# Patient Record
Sex: FEMALE | Race: WHITE | NOT HISPANIC OR LATINO | Employment: FULL TIME | URBAN - METROPOLITAN AREA
[De-identification: names, ages, dates, MRNs, and addresses within clinical notes are randomized per-mention and may not be internally consistent; named-entity substitution may affect disease eponyms.]

---

## 2017-03-01 ENCOUNTER — HOSPITAL ENCOUNTER (OUTPATIENT)
Dept: URGENT CARE | Facility: CLINIC | Age: 42
Setting detail: SPECIMEN
Discharge: HOME | End: 2017-03-01
Attending: FAMILY MEDICINE | Admitting: FAMILY MEDICINE

## 2017-03-01 LAB
C TRACH RRNA SPEC QL PROBE: NORMAL
N GONORRHOEA RRNA SPEC QL PROBE: NORMAL
SPECIMEN SOURCE: NORMAL

## 2017-06-19 ENCOUNTER — OFFICE VISIT (OUTPATIENT)
Dept: FAMILY MEDICINE CLINIC | Facility: CLINIC | Age: 42
End: 2017-06-19

## 2017-06-19 VITALS
HEART RATE: 73 BPM | DIASTOLIC BLOOD PRESSURE: 88 MMHG | HEIGHT: 64 IN | WEIGHT: 181.3 LBS | TEMPERATURE: 98.7 F | SYSTOLIC BLOOD PRESSURE: 118 MMHG | OXYGEN SATURATION: 99 % | BODY MASS INDEX: 30.95 KG/M2

## 2017-06-19 DIAGNOSIS — Z00.00 HEALTH CARE MAINTENANCE: Primary | ICD-10-CM

## 2017-06-19 LAB
ALBUMIN SERPL-MCNC: 4.5 G/DL (ref 3.5–5.2)
ALBUMIN/GLOB SERPL: 1.2 G/DL
ALP SERPL-CCNC: 57 U/L (ref 39–117)
ALT SERPL-CCNC: 20 U/L (ref 1–33)
AST SERPL-CCNC: 22 U/L (ref 1–32)
BASOPHILS # BLD AUTO: 0.05 10*3/MM3 (ref 0–0.2)
BASOPHILS NFR BLD AUTO: 1.1 % (ref 0–1.5)
BILIRUB SERPL-MCNC: 0.4 MG/DL (ref 0.1–1.2)
BUN SERPL-MCNC: 12 MG/DL (ref 6–20)
BUN/CREAT SERPL: 12.8 (ref 7–25)
CALCIUM SERPL-MCNC: 9.5 MG/DL (ref 8.6–10.5)
CHLORIDE SERPL-SCNC: 101 MMOL/L (ref 98–107)
CHOLEST SERPL-MCNC: 163 MG/DL (ref 0–200)
CO2 SERPL-SCNC: 23 MMOL/L (ref 22–29)
CREAT SERPL-MCNC: 0.94 MG/DL (ref 0.57–1)
DIFFERENTIAL COMMENT: NORMAL
EOSINOPHIL # BLD AUTO: 0.06 10*3/MM3 (ref 0–0.7)
EOSINOPHIL NFR BLD AUTO: 1.3 % (ref 0.3–6.2)
ERYTHROCYTE [DISTWIDTH] IN BLOOD BY AUTOMATED COUNT: 14.4 % (ref 11.7–13)
GLOBULIN SER CALC-MCNC: 3.7 GM/DL
GLUCOSE SERPL-MCNC: 99 MG/DL (ref 65–99)
HBA1C MFR BLD: 5.84 % (ref 4.8–5.6)
HCT VFR BLD AUTO: 40.5 % (ref 35.6–45.5)
HDLC SERPL-MCNC: 87 MG/DL (ref 40–60)
HGB BLD-MCNC: 13.6 G/DL (ref 11.9–15.5)
IMM GRANULOCYTES # BLD: 0 10*3/MM3 (ref 0–0.03)
IMM GRANULOCYTES NFR BLD: 0 % (ref 0–0.5)
LDLC SERPL CALC-MCNC: 65 MG/DL (ref 0–100)
LYMPHOCYTES # BLD AUTO: 2.62 10*3/MM3 (ref 0.9–4.8)
LYMPHOCYTES NFR BLD AUTO: 56.5 % (ref 19.6–45.3)
MCH RBC QN AUTO: 30.6 PG (ref 26.9–32)
MCHC RBC AUTO-ENTMCNC: 33.6 G/DL (ref 32.4–36.3)
MCV RBC AUTO: 91 FL (ref 80.5–98.2)
MONOCYTES # BLD AUTO: 0.33 10*3/MM3 (ref 0.2–1.2)
MONOCYTES NFR BLD AUTO: 7.1 % (ref 5–12)
NEUTROPHILS # BLD AUTO: 1.58 10*3/MM3 (ref 1.9–8.1)
NEUTROPHILS NFR BLD AUTO: 34 % (ref 42.7–76)
PLATELET # BLD AUTO: 224 10*3/MM3 (ref 140–500)
PLATELET BLD QL SMEAR: NORMAL
POTASSIUM SERPL-SCNC: 4.2 MMOL/L (ref 3.5–5.2)
PROT SERPL-MCNC: 8.2 G/DL (ref 6–8.5)
RBC # BLD AUTO: 4.45 10*6/MM3 (ref 3.9–5.2)
RBC MORPH BLD: NORMAL
SODIUM SERPL-SCNC: 139 MMOL/L (ref 136–145)
TRIGL SERPL-MCNC: 57 MG/DL (ref 0–150)
VLDLC SERPL CALC-MCNC: 11.4 MG/DL (ref 5–40)
WBC # BLD AUTO: 4.64 10*3/MM3 (ref 4.5–10.7)

## 2017-06-19 PROCEDURE — 99396 PREV VISIT EST AGE 40-64: CPT | Performed by: FAMILY MEDICINE

## 2017-06-19 NOTE — PROGRESS NOTES
"Ofelia Holder is a 41 y.o. female.     Chief Complaint   Patient presents with   • Annual Exam     pt is fasting        History of Present Illness    Here for annual complete physical examination.  She's gained a little bit of weight.  She works at home.  Start exercising last couple weeks.  She is walking 2 miles a day about 4 times a week.    Social History   Substance Use Topics   • Smoking status: Never Smoker   • Smokeless tobacco: Never Used   • Alcohol use Yes      Comment: SOCIAL USE ...  6-8 drinks a week     Immunization History   Administered Date(s) Administered   • Influenza, Quadrivalent 10/01/2014   • Tdap 08/01/2006, 06/06/2016       No change in her family history.  Still diabetes and hypertension.  No cancer in immediate relatives.    Patient like to be screened for diabetes.    She continues to follow with her gynecologist.  She had a hysterectomy done, ovary still present, last year for fibroids.  Her ongoing iron deficiency anemia resolved.    The following portions of the patient's history were reviewed and updated as appropriate: allergies, current medications, past family history, past medical history, past social history, past surgical history and problem list.          Review of Systems   Constitutional: Negative.    HENT: Negative.    Respiratory: Negative.    Cardiovascular: Negative.  Negative for chest pain, palpitations and leg swelling.   Gastrointestinal: Positive for constipation. Negative for blood in stool.   Endocrine: Negative.    Genitourinary: Negative.    Musculoskeletal: Negative.    Skin: Negative.    Neurological: Negative.    Psychiatric/Behavioral: Negative.  Negative for dysphoric mood.   All other systems reviewed and are negative.      Objective   Blood pressure 118/88, pulse 73, temperature 98.7 °F (37.1 °C), temperature source Oral, height 64\" (162.6 cm), weight 181 lb 4.8 oz (82.2 kg), last menstrual period 03/03/2016, SpO2 99 %.  Physical Exam "   Constitutional: She is oriented to person, place, and time. She appears well-developed and well-nourished.   HENT:   Head: Normocephalic and atraumatic.   Right Ear: Tympanic membrane, external ear and ear canal normal.   Left Ear: Tympanic membrane, external ear and ear canal normal.   Nose: Nose normal.   Mouth/Throat: Oropharynx is clear and moist. No oropharyngeal exudate.   Eyes: EOM are normal. Pupils are equal, round, and reactive to light. No scleral icterus.   Neck: Normal range of motion. Neck supple. No thyromegaly present.   Cardiovascular: Normal rate, regular rhythm, normal heart sounds and intact distal pulses.    No murmur heard.  Pulmonary/Chest: Effort normal and breath sounds normal. She has no wheezes.   Abdominal: Soft. Bowel sounds are normal. She exhibits no distension and no mass. There is no tenderness. No hernia.   Musculoskeletal: Normal range of motion. She exhibits no edema.   Lymphadenopathy:     She has no cervical adenopathy.   Neurological: She is alert and oriented to person, place, and time. She exhibits normal muscle tone.   Skin: Skin is warm and dry. No rash noted.   Psychiatric: She has a normal mood and affect. Her behavior is normal. Judgment and thought content normal.   Nursing note and vitals reviewed.      Assessment/Plan   Noemi was seen today for annual exam.    Diagnoses and all orders for this visit:    Health care maintenance  -     Hemoglobin A1c  -     CBC & Differential  -     Comprehensive Metabolic Panel  -     Lipid Panel    Annual health care maintenance examination.  Diet and exercise discussed in great detail.  Moderate alcohol use discussed.  Checking lab work including screening diabetes lab work below.  I will see her back in one year for recheck.  Lab work prior.  She will continue to follow with her gynecologist.  She has mammograms through them.  Immunizations are up-to-date.

## 2017-09-11 ENCOUNTER — TELEPHONE (OUTPATIENT)
Dept: FAMILY MEDICINE CLINIC | Facility: CLINIC | Age: 42
End: 2017-09-11

## 2017-09-11 DIAGNOSIS — K59.00 CONSTIPATION, UNSPECIFIED CONSTIPATION TYPE: Primary | ICD-10-CM

## 2017-09-11 NOTE — TELEPHONE ENCOUNTER
She would like to see a gastroenterologist. And then just try the Miralax and Senokox until seen by them in the mean time. Please let me know the dr so I can put in the order. Thanks

## 2017-09-11 NOTE — TELEPHONE ENCOUNTER
Pt is calling saying that she still has a lot of problems with constipation she drinks a lot of water daily. And has increased her fruits and veg daily and takes mirlax daily and its not help. She was wondering how you felt about her taking Linzess 72 mcg daily? Please advise.

## 2017-09-11 NOTE — TELEPHONE ENCOUNTER
That is not a medication I typically prescribed.  Usually only prescribed by gastroenterologist.  Also very expensive.  I would be happy to see her in the office to discuss options, also to see if there is further workup needed.  In the meantime she could add to the MiraLAX some Senokot.  I would also be happy to refer her to a gastroenterologist, would NOT need to see me first

## 2017-11-01 ENCOUNTER — OFFICE VISIT (OUTPATIENT)
Dept: GASTROENTEROLOGY | Facility: CLINIC | Age: 42
End: 2017-11-01

## 2017-11-01 VITALS
HEIGHT: 64 IN | TEMPERATURE: 98.3 F | WEIGHT: 184 LBS | BODY MASS INDEX: 31.41 KG/M2 | DIASTOLIC BLOOD PRESSURE: 80 MMHG | SYSTOLIC BLOOD PRESSURE: 124 MMHG

## 2017-11-01 DIAGNOSIS — K59.04 CHRONIC IDIOPATHIC CONSTIPATION: Primary | ICD-10-CM

## 2017-11-01 PROCEDURE — 99203 OFFICE O/P NEW LOW 30 MIN: CPT | Performed by: INTERNAL MEDICINE

## 2017-11-01 RX ORDER — POLYETHYLENE GLYCOL 3350 17 G/17G
17 POWDER, FOR SOLUTION ORAL DAILY
COMMUNITY
End: 2018-10-25

## 2017-11-01 NOTE — PROGRESS NOTES
Chief Complaint   Patient presents with   • Constipation     Noemi Holder is a 41 y.o. female who presents with constipation.  Longstanding issue. She does not take medication she will not have a bowel movement for a week.  She has tried senna in the past.  She has tried MiraLAX and she is currently taking this once a day.  She is really not getting any relief from this.  She's used enemas in the past.  She denies abdominal pain but she does feel nauseated and she gets distended and bloated.  No FH CRC/polyps.  She had a normal egd/colonoscopy at age 38 for llq pain - these were normal.   She was foujd to have gallstones - these are just being observed.    HPI  Past Medical History:   Diagnosis Date   • Anemia    • Breakthrough bleeding on birth control pills    • Constipation    • Fibroids, subserous    • Lactose intolerance At age 3o   • PONV (postoperative nausea and vomiting)      Past Surgical History:   Procedure Laterality Date   • CERVICAL CERCLAGE     • COLONOSCOPY  At age 38   • HAMMER TOE REPAIR     • MYOMECTOMY     • PARTIAL HYSTERECTOMY     • WA LAP, RADICAL HYST W/ TUBE&OV, NODE BX N/A 3/8/2016    Procedure: TOTAL LAPAROSCOPIC HYSTERECTOMY W/KOURTNEY SALPINGECTOMY, DILATATION AND CURETTAGE, CYSTOSCOPY;  Surgeon: Sophie Fleming MD;  Location: [facility];  Service: Gynecology   • UPPER GASTROINTESTINAL ENDOSCOPY  At age 38       Current Outpatient Prescriptions:   •  BLACK CURRANT SEED OIL PO, Take  by mouth., Disp: , Rfl:   •  linaclotide (LINZESS) 145 MCG capsule capsule, Take 1 capsule by mouth Every Morning Before Breakfast., Disp: 30 capsule, Rfl: 5  •  magnesium hydroxide (DUNCAN CHEWS) 311 MG chewable tablet chewable tablet, Chew 311 mg Every 4 (Four) Hours As Needed., Disp: , Rfl:   •  Multiple Vitamins-Minerals (HAIR SKIN NAILS PO), Take  by mouth., Disp: , Rfl:   •  polyethylene glycol (MIRALAX) powder, Take 17 g by mouth Daily., Disp: , Rfl:   Allergies   Allergen Reactions   • Flagyl  [Metronidazole] Hives and Shortness Of Breath   • Penicillins Hives and Shortness Of Breath     Social History     Social History   • Marital status:      Spouse name: N/A   • Number of children: N/A   • Years of education: N/A     Occupational History   • Not on file.     Social History Main Topics   • Smoking status: Never Smoker   • Smokeless tobacco: Never Used   • Alcohol use Yes     3 Glasses of wine per week      Comment: SOCIAL USE ...  6-8 drinks a week   • Drug use: No   • Sexual activity: Yes     Partners: Male     Birth control/ protection: Condom     Other Topics Concern   • Not on file     Social History Narrative     Family History   Problem Relation Age of Onset   • Hypertension Mother    • Hypertension Father    • Diabetes Father    • Diabetes Sister      Review of Systems   Constitutional: Negative for appetite change and unexpected weight change.   Gastrointestinal: Positive for abdominal distention and constipation. Negative for abdominal pain and blood in stool.   All other systems reviewed and are negative.    Vitals:    11/01/17 1421   BP: 124/80   Temp: 98.3 °F (36.8 °C)     Last Weight    11/01/17  1421   Weight: 184 lb (83.5 kg)     Physical Exam   Constitutional: She is oriented to person, place, and time. She appears well-developed and well-nourished.   HENT:   Head: Normocephalic and atraumatic.   Eyes: Conjunctivae are normal. No scleral icterus.   Neck: Normal range of motion. Neck supple.   Pulmonary/Chest: Effort normal.   Abdominal: Soft. She exhibits distension.   Musculoskeletal: She exhibits no edema.   Neurological: She is alert and oriented to person, place, and time.   Skin: Skin is warm and dry.   Psychiatric: She has a normal mood and affect.   Nursing note and vitals reviewed.    No images are attached to the encounter.  No notes on file  Noemi was seen today for constipation.    Diagnoses and all orders for this visit:    Chronic idiopathic  constipation    Other orders  -     linaclotide (LINZESS) 145 MCG capsule capsule; Take 1 capsule by mouth Every Morning Before Breakfast.    Plan-  Trial Linzess 145 µg daily.  Continue with adequate fiber consumption as well as water consumption.  Moderate exercise is recommended.  I've asked her to call if she feels like she needs the higher dose of this medication.

## 2017-12-14 ENCOUNTER — TELEPHONE (OUTPATIENT)
Dept: GASTROENTEROLOGY | Facility: CLINIC | Age: 42
End: 2017-12-14

## 2017-12-14 NOTE — TELEPHONE ENCOUNTER
----- Message from Noemi Holder sent at 12/14/2017 12:12 PM EST -----  Regarding: Visit Follow-Up Question  Contact: [phone]  Good afternoon,  My name is Noemi Holder and I was in to see Dr. Poole last month and was prescribed Linzess. The medication has helped, but my prescription cost me $292 with insurance. That is not going to be sustainable and I was wondering if there was something else that could be prescribed that is more cost effective.    Thank you,    Noemi

## 2017-12-19 NOTE — TELEPHONE ENCOUNTER
Lorraine check to see if she can use a coupon card to get this for cheaper?  Otherwise please send Amitiza 8 µg twice a day to her pharmacy.  Please advise her to take this with food.  Please also offer her coupon card for this medication and make sure she has scheduled follow-up

## 2017-12-20 NOTE — TELEPHONE ENCOUNTER
Called pt and pt reports that she did try to use the coupon card at her [facility], but felt like they did not know what they were doing.   Advised pt we can resend the linzess script to a different pharmacy for her to try the discount card or we can send in new script for amitiza 8mcg bid to take with food.  Pt verb understanding and states for now she would like us to send her script to [facility].  Advised we will escribe this and for her .  Pt states she will call us back if the discount coupon does not work.

## 2017-12-21 ENCOUNTER — TELEPHONE (OUTPATIENT)
Dept: GASTROENTEROLOGY | Facility: CLINIC | Age: 42
End: 2017-12-21

## 2017-12-21 RX ORDER — LUBIPROSTONE 8 UG/1
8 CAPSULE ORAL 2 TIMES DAILY WITH MEALS
Qty: 60 CAPSULE | Refills: 3 | Status: SHIPPED | OUTPATIENT
Start: 2017-12-21 | End: 2018-02-21

## 2017-12-21 NOTE — TELEPHONE ENCOUNTER
See note of 12/14.    Call to pt.  Requesting Amitiza.  Pharmacy verified.  Escribe completed for Amitiza 8 mcg 1 tab po BID with food, #60, R3 escribe completed.

## 2017-12-29 ENCOUNTER — APPOINTMENT (OUTPATIENT)
Dept: WOMENS IMAGING | Facility: HOSPITAL | Age: 42
End: 2017-12-29

## 2017-12-29 PROCEDURE — 77063 BREAST TOMOSYNTHESIS BI: CPT | Performed by: RADIOLOGY

## 2017-12-29 PROCEDURE — 77067 SCR MAMMO BI INCL CAD: CPT | Performed by: RADIOLOGY

## 2018-02-21 ENCOUNTER — TELEPHONE (OUTPATIENT)
Dept: GASTROENTEROLOGY | Facility: CLINIC | Age: 43
End: 2018-02-21

## 2018-02-21 NOTE — TELEPHONE ENCOUNTER
Called pt and pt reports that she did not get the amitiza script filled due to the cost.  She has however been paying $300 per month for her [id number].  Pt states if she has her script sent to her [facility] and they run it as cash she can use the savings card.  Advised pt we would escribe linzess 1456mcg take one po daily before breakfast #30 with 3 refills to her [facility]. Pt verb understanding.     Update sent to Dr Jennings.

## 2018-02-21 NOTE — TELEPHONE ENCOUNTER
----- Message from Sue Shi sent at 2/21/2018  8:26 AM EST -----  Regarding: selin  Contact: [phone]  Pt called with questions about the saving card or changes med

## 2018-10-18 DIAGNOSIS — Z00.00 HEALTH CARE MAINTENANCE: Primary | ICD-10-CM

## 2018-10-18 DIAGNOSIS — R73.09 ELEVATED GLUCOSE LEVEL: ICD-10-CM

## 2018-10-19 LAB
ALBUMIN SERPL-MCNC: 5.1 G/DL (ref 3.5–5.2)
ALBUMIN/GLOB SERPL: 1.5 G/DL
ALP SERPL-CCNC: 63 U/L (ref 39–117)
ALT SERPL-CCNC: 19 U/L (ref 1–33)
APPEARANCE UR: CLEAR
AST SERPL-CCNC: 20 U/L (ref 1–32)
BASOPHILS # BLD MANUAL: 0.04 10*3/MM3 (ref 0–0.2)
BASOPHILS NFR BLD MANUAL: 1 % (ref 0–1.5)
BILIRUB SERPL-MCNC: 0.3 MG/DL (ref 0.1–1.2)
BILIRUB UR QL STRIP: NEGATIVE
BUN SERPL-MCNC: 11 MG/DL (ref 6–20)
BUN/CREAT SERPL: 11.2 (ref 7–25)
CALCIUM SERPL-MCNC: 9.9 MG/DL (ref 8.6–10.5)
CHLORIDE SERPL-SCNC: 101 MMOL/L (ref 98–107)
CHOLEST SERPL-MCNC: 149 MG/DL (ref 0–200)
CO2 SERPL-SCNC: 27.2 MMOL/L (ref 22–29)
COLOR UR: YELLOW
CREAT SERPL-MCNC: 0.98 MG/DL (ref 0.57–1)
DIFFERENTIAL COMMENT: ABNORMAL
EOSINOPHIL # BLD MANUAL: 0.04 10*3/MM3 (ref 0–0.7)
EOSINOPHIL NFR BLD MANUAL: 1 % (ref 0.3–6.2)
ERYTHROCYTE [DISTWIDTH] IN BLOOD BY AUTOMATED COUNT: 14.2 % (ref 11.7–13)
GLOBULIN SER CALC-MCNC: 3.3 GM/DL
GLUCOSE SERPL-MCNC: 104 MG/DL (ref 65–99)
GLUCOSE UR QL: NEGATIVE
HBA1C MFR BLD: 6 % (ref 4.8–5.6)
HCT VFR BLD AUTO: 43.6 % (ref 35.6–45.5)
HDLC SERPL-MCNC: 76 MG/DL (ref 40–60)
HGB BLD-MCNC: 14.2 G/DL (ref 11.9–15.5)
HGB UR QL STRIP: NEGATIVE
KETONES UR QL STRIP: NEGATIVE
LDLC SERPL CALC-MCNC: 63 MG/DL (ref 0–100)
LEUKOCYTE ESTERASE UR QL STRIP: NEGATIVE
LYMPHOCYTES # BLD MANUAL: 2.34 10*3/MM3 (ref 0.9–4.8)
LYMPHOCYTES NFR BLD MANUAL: 54 % (ref 19.6–45.3)
MCH RBC QN AUTO: 29.6 PG (ref 26.9–32)
MCHC RBC AUTO-ENTMCNC: 32.6 G/DL (ref 32.4–36.3)
MCV RBC AUTO: 91 FL (ref 80.5–98.2)
MONOCYTES # BLD MANUAL: 0.35 10*3/MM3 (ref 0.2–1.2)
MONOCYTES NFR BLD MANUAL: 8 % (ref 5–12)
NEUTROPHILS # BLD MANUAL: 1.47 10*3/MM3 (ref 1.9–8.1)
NEUTROPHILS NFR BLD MANUAL: 34 % (ref 42.7–76)
NITRITE UR QL STRIP: NEGATIVE
PH UR STRIP: 7 [PH] (ref 5–8)
PLATELET # BLD AUTO: 216 10*3/MM3 (ref 140–500)
PLATELET BLD QL SMEAR: ABNORMAL
POTASSIUM SERPL-SCNC: 4.4 MMOL/L (ref 3.5–5.2)
PROT SERPL-MCNC: 8.4 G/DL (ref 6–8.5)
PROT UR QL STRIP: NEGATIVE
RBC # BLD AUTO: 4.79 10*6/MM3 (ref 3.9–5.2)
RBC MORPH BLD: ABNORMAL
SODIUM SERPL-SCNC: 138 MMOL/L (ref 136–145)
SP GR UR: 1.01 (ref 1–1.03)
TRIGL SERPL-MCNC: 49 MG/DL (ref 0–150)
UROBILINOGEN UR STRIP-MCNC: NORMAL MG/DL
VLDLC SERPL CALC-MCNC: 9.8 MG/DL (ref 5–40)
WBC # BLD AUTO: 4.33 10*3/MM3 (ref 4.5–10.7)

## 2018-10-25 ENCOUNTER — OFFICE VISIT (OUTPATIENT)
Dept: FAMILY MEDICINE CLINIC | Facility: CLINIC | Age: 43
End: 2018-10-25

## 2018-10-25 VITALS
BODY MASS INDEX: 30.56 KG/M2 | WEIGHT: 179 LBS | HEIGHT: 64 IN | DIASTOLIC BLOOD PRESSURE: 96 MMHG | OXYGEN SATURATION: 100 % | TEMPERATURE: 98.1 F | HEART RATE: 87 BPM | SYSTOLIC BLOOD PRESSURE: 135 MMHG

## 2018-10-25 DIAGNOSIS — R73.01 IMPAIRED FASTING GLUCOSE: ICD-10-CM

## 2018-10-25 DIAGNOSIS — Z00.00 HEALTH CARE MAINTENANCE: Primary | ICD-10-CM

## 2018-10-25 DIAGNOSIS — Z23 NEED FOR IMMUNIZATION AGAINST INFLUENZA: ICD-10-CM

## 2018-10-25 DIAGNOSIS — R03.0 ELEVATED BLOOD PRESSURE READING: ICD-10-CM

## 2018-10-25 PROBLEM — K58.1 IRRITABLE BOWEL SYNDROME WITH CONSTIPATION: Status: ACTIVE | Noted: 2018-10-25

## 2018-10-25 PROCEDURE — 99396 PREV VISIT EST AGE 40-64: CPT | Performed by: FAMILY MEDICINE

## 2018-10-25 RX ORDER — BIOTIN 5 MG
1 TABLET ORAL DAILY
COMMUNITY
End: 2020-10-27

## 2018-10-25 RX ORDER — ACETAMINOPHEN 500 MG
500 TABLET ORAL EVERY 8 HOURS PRN
COMMUNITY
Start: 2018-03-15

## 2018-10-25 RX ORDER — METFORMIN HYDROCHLORIDE 500 MG/1
500 TABLET, EXTENDED RELEASE ORAL
Qty: 90 TABLET | Refills: 1 | Status: SHIPPED | OUTPATIENT
Start: 2018-10-25 | End: 2019-03-28 | Stop reason: SDUPTHER

## 2018-10-25 RX ORDER — NAPROXEN 500 MG/1
500 TABLET ORAL DAILY PRN
COMMUNITY
Start: 2018-03-15 | End: 2019-02-19

## 2018-10-25 RX ORDER — VALACYCLOVIR HYDROCHLORIDE 500 MG/1
500 TABLET, FILM COATED ORAL DAILY
COMMUNITY

## 2018-10-25 NOTE — PROGRESS NOTES
Subjective   Noemi Holder is a 42 y.o. female.     Chief Complaint   Patient presents with   • Annual Exam     follow up labs   • Insomnia     x 3 yrs   • Sore Throat     x 2 days        History of Present Illness    For annual wellness visit.    Slight URI symptoms with a sore throat for 2 days.  No fever.    Impaired fasting glucose.  Strong family history of diabetes in multiple relatives.  Her A1c is 6.0%.  It is up.  Her fasting glucose is minimally elevated.  No diabetic symptoms.  She has not been exercising as much as she used to.  He does have a plan to eat better and exercise more.    Insomnia.  Trouble falling asleep and trouble staying asleep.  Some anxiety and worrying.  No daily anxiety or panic attacks.  No depression symptoms.  Minimal alcohol use.  No exercising in the late evening.    Social History   Substance Use Topics   • Smoking status: Never Smoker   • Smokeless tobacco: Never Used   • Alcohol use Yes     3 Glasses of wine per week      Comment: SOCIAL USE ...  6-8 drinks a week     Immunization History   Administered Date(s) Administered   • Flu Mist 10/01/2014   • Tdap 08/01/2006, 06/06/2016     Family History   Problem Relation Age of Onset   • Hypertension Mother    • Hypertension Father    • Diabetes Father    • Diabetes Sister          The following portions of the patient's history were reviewed and updated as appropriate: allergies, current medications, past family history, past medical history, past social history, past surgical history and problem list.          Review of Systems   Constitutional: Negative.    HENT: Positive for sore throat.    Eyes: Negative.    Respiratory: Negative.    Cardiovascular: Negative.    Gastrointestinal: Negative.  Negative for blood in stool.   Endocrine: Negative.    Genitourinary: Negative.  Negative for difficulty urinating and hematuria.   Musculoskeletal: Negative.    Skin: Negative.    Neurological: Negative for headaches.  "  Psychiatric/Behavioral: Negative.  Negative for dysphoric mood.   All other systems reviewed and are negative.      Objective   Blood pressure 135/96, pulse 87, temperature 98.1 °F (36.7 °C), temperature source Oral, height 162.6 cm (64.02\"), weight 81.2 kg (179 lb), last menstrual period 03/03/2016, SpO2 100 %.  Physical Exam   Constitutional: She is oriented to person, place, and time. She appears well-developed and well-nourished.   HENT:   Head: Normocephalic and atraumatic.   Right Ear: Tympanic membrane, external ear and ear canal normal.   Left Ear: Tympanic membrane, external ear and ear canal normal.   Nose: Nose normal.   Mouth/Throat: Oropharynx is clear and moist. No oropharyngeal exudate.   Eyes: Pupils are equal, round, and reactive to light. EOM are normal. No scleral icterus.   Neck: Normal range of motion. Neck supple. No thyromegaly present.   Cardiovascular: Normal rate, regular rhythm, normal heart sounds and intact distal pulses.    No murmur heard.  Pulmonary/Chest: Effort normal and breath sounds normal. She has no wheezes.   Abdominal: Soft. Bowel sounds are normal. She exhibits no distension and no mass. There is no tenderness. No hernia.   Musculoskeletal: Normal range of motion. She exhibits no edema.   Lymphadenopathy:     She has no cervical adenopathy.   Neurological: She is alert and oriented to person, place, and time. She exhibits normal muscle tone.   Skin: Skin is warm and dry. No rash noted.   Psychiatric: She has a normal mood and affect. Her behavior is normal. Judgment and thought content normal.   Nursing note and vitals reviewed.      Assessment/Plan   Noemi was seen today for annual exam, insomnia and sore throat.    Diagnoses and all orders for this visit:    Health care maintenance    Impaired fasting glucose  -     Comprehensive Metabolic Panel; Future  -     Hemoglobin A1c; Future    Elevated blood pressure reading    Other orders  -     metFORMIN ER " (GLUCOPHAGE-XR) 500 MG 24 hr tablet; Take 1 tablet by mouth Daily With Breakfast.        Annual wellness visit.  Next    Immunizations.  Flu vaccine up-to-date.  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  She thinks she can get at work for free.    Insomnia.  Discussed sleep hygiene.  Discussed her anxiety symptoms.  Recommend she consider cognitive behavioral therapy through her work program.  We discussed mindfulness meditation in detail again for Internet resources.  This time no need for medication.  No need for SSRI education for anxiety.  However if anxiety getting worse to consider further treatment.    Recommend regular exercise may also help her insomnia.    Impaired fasting glucose.  A1c 6.0%.  Strong family history of diabetes.  I'm recommending metformin next our 500 mg daily to reduce glucose and possibly helping prevent evolution 2 diabetes type 2.  We discussed diet and exercise as above.  Side effects of medication discussed.    IBS constipation predominant.  She continues to follow with her GI doctor.  The metformin max a help this.    Elevated blood pressure.  She is going to be checking readings at home and sending us notes.  Otherwise see me in 6 months

## 2018-12-26 ENCOUNTER — APPOINTMENT (OUTPATIENT)
Dept: WOMENS IMAGING | Facility: HOSPITAL | Age: 43
End: 2018-12-26

## 2018-12-26 PROCEDURE — 77067 SCR MAMMO BI INCL CAD: CPT | Performed by: RADIOLOGY

## 2018-12-31 RX ORDER — LINACLOTIDE 145 UG/1
CAPSULE, GELATIN COATED ORAL
Qty: 30 CAPSULE | Refills: 0 | OUTPATIENT
Start: 2018-12-31

## 2019-01-02 ENCOUNTER — TELEPHONE (OUTPATIENT)
Dept: GASTROENTEROLOGY | Facility: CLINIC | Age: 44
End: 2019-01-02

## 2019-01-02 NOTE — TELEPHONE ENCOUNTER
----- Message from Brisa Carrillo sent at 1/2/2019  3:40 PM EST -----  Patient is needing a two week supply for linzess, she is out. She is scheduled to see misite late January for the med refill.     Thanks

## 2019-01-22 ENCOUNTER — OFFICE VISIT (OUTPATIENT)
Dept: GASTROENTEROLOGY | Facility: CLINIC | Age: 44
End: 2019-01-22

## 2019-01-22 VITALS
SYSTOLIC BLOOD PRESSURE: 132 MMHG | TEMPERATURE: 98 F | WEIGHT: 186.6 LBS | HEIGHT: 64 IN | BODY MASS INDEX: 31.86 KG/M2 | DIASTOLIC BLOOD PRESSURE: 76 MMHG

## 2019-01-22 DIAGNOSIS — K58.1 IRRITABLE BOWEL SYNDROME WITH CONSTIPATION: Primary | ICD-10-CM

## 2019-01-22 PROCEDURE — 99213 OFFICE O/P EST LOW 20 MIN: CPT | Performed by: NURSE PRACTITIONER

## 2019-01-22 RX ORDER — MELOXICAM 15 MG/1
15 TABLET ORAL AS NEEDED
Refills: 0 | COMMUNITY
Start: 2018-12-10 | End: 2019-02-19

## 2019-01-22 NOTE — PROGRESS NOTES
Chief Complaint   Patient presents with   • Constipation       Noemi Holder is a  43 y.o. female here for a follow up visit for constipation.    HPI  42 yo f presents today for follow up visit for IBS-C. She is a patient of Dr. Jennings. She was last seen in the office on 11/2017. She has hx IBS-C and admits the Linzess 145 just doesn't seem to be working well anymore. She would like to try the stronger dose. She denies any dysphagia, reflux, abd pain, N&V, diarrhea, rectal bleeding or melena. She admits her appetite is good and her weight is stable.     Her next colonoscopy will be due 2024.     Past Medical History:   Diagnosis Date   • Anemia    • Breakthrough bleeding on birth control pills    • Constipation    • Fibroids, subserous    • Lactose intolerance At age 3o   • PONV (postoperative nausea and vomiting)        Past Surgical History:   Procedure Laterality Date   • CERVICAL CERCLAGE     • COLONOSCOPY  At age 38   • HAMMER TOE REPAIR     • MYOMECTOMY     • PARTIAL HYSTERECTOMY     • IN LAP, RADICAL HYST W/ TUBE&OV, NODE BX N/A 3/8/2016    Procedure: TOTAL LAPAROSCOPIC HYSTERECTOMY W/KOURTNEY SALPINGECTOMY, DILATATION AND CURETTAGE, CYSTOSCOPY;  Surgeon: Sophie Fleming MD;  Location: [facility];  Service: Gynecology   • UPPER GASTROINTESTINAL ENDOSCOPY  At age 38       Scheduled Meds:    Continuous Infusions:  No current facility-administered medications for this visit.     PRN Meds:.    Allergies   Allergen Reactions   • Flagyl [Metronidazole] Hives and Shortness Of Breath   • Penicillins Hives and Shortness Of Breath       Social History     Socioeconomic History   • Marital status:      Spouse name: Not on file   • Number of children: Not on file   • Years of education: Not on file   • Highest education level: Not on file   Social Needs   • Financial resource strain: Not on file   • Food insecurity - worry: Not on file   • Food insecurity - inability: Not on file   • Transportation needs -  medical: Not on file   • Transportation needs - non-medical: Not on file   Occupational History   • Not on file   Tobacco Use   • Smoking status: Never Smoker   • Smokeless tobacco: Never Used   Substance and Sexual Activity   • Alcohol use: Yes     Types: 3 Glasses of wine per week     Comment: SOCIAL USE ...  6-8 drinks a week   • Drug use: No   • Sexual activity: Yes     Partners: Male     Birth control/protection: Condom   Other Topics Concern   • Not on file   Social History Narrative   • Not on file       Family History   Problem Relation Age of Onset   • Hypertension Mother    • Hypertension Father    • Diabetes Father    • Diabetes Sister        Review of Systems   Constitutional: Negative for appetite change, chills, diaphoresis, fatigue, fever and unexpected weight change.   HENT: Negative for nosebleeds, postnasal drip, sore throat, trouble swallowing and voice change.    Respiratory: Negative for cough, choking, chest tightness, shortness of breath and wheezing.    Cardiovascular: Negative for chest pain.   Gastrointestinal: Positive for constipation. Negative for abdominal distention, abdominal pain, anal bleeding, blood in stool, diarrhea, nausea, rectal pain and vomiting.   Endocrine: Negative for polydipsia, polyphagia and polyuria.   Musculoskeletal: Negative for gait problem.   Skin: Negative for rash and wound.   Allergic/Immunologic: Negative for food allergies.   Neurological: Negative for dizziness, speech difficulty and light-headedness.   Psychiatric/Behavioral: Negative for confusion, self-injury, sleep disturbance and suicidal ideas.       Vitals:    01/22/19 1505   BP: 132/76   Temp: 98 °F (36.7 °C)       Physical Exam   Constitutional: She is oriented to person, place, and time. She appears well-developed and well-nourished. She does not appear ill. No distress.   HENT:   Head: Normocephalic.   Eyes: Pupils are equal, round, and reactive to light.   Cardiovascular: Normal rate, regular  rhythm and normal heart sounds.   Pulmonary/Chest: Effort normal and breath sounds normal.   Abdominal: Soft. Bowel sounds are normal. She exhibits no distension and no mass. There is no hepatosplenomegaly. There is no tenderness. There is no rebound and no guarding. No hernia.   Musculoskeletal: Normal range of motion.   Neurological: She is alert and oriented to person, place, and time.   Skin: Skin is warm and dry.   Psychiatric: She has a normal mood and affect. Her speech is normal and behavior is normal. Judgment normal.       No images are attached to the encounter.    Assessment & Plan     1. Irritable bowel syndrome with constipation    IBS-C is not well controlled. Will give her samples of the 290 to try. Patient to call the office next week with update. Follow up with me or DR. Jennings in 1 year.

## 2019-01-23 ENCOUNTER — RESULTS ENCOUNTER (OUTPATIENT)
Dept: FAMILY MEDICINE CLINIC | Facility: CLINIC | Age: 44
End: 2019-01-23

## 2019-01-23 DIAGNOSIS — R73.01 IMPAIRED FASTING GLUCOSE: ICD-10-CM

## 2019-02-06 ENCOUNTER — TELEPHONE (OUTPATIENT)
Dept: GASTROENTEROLOGY | Facility: CLINIC | Age: 44
End: 2019-02-06

## 2019-02-06 NOTE — TELEPHONE ENCOUNTER
----- Message from Sue Shi sent at 2/6/2019  3:13 PM EST -----  Regarding: linzess 290 mg   Contact: [phone]  Pt stated medication is not work and taking two dose

## 2019-02-07 NOTE — TELEPHONE ENCOUNTER
Would continue the Linzess 290 daily and add some daily miralax and a stool softener if she hasnt already.

## 2019-02-07 NOTE — TELEPHONE ENCOUNTER
Call to pt.  States has been taking linzess 290 mcg as instructed with o/v of 1/22.  Has BM about 2x/week - persistent full/constipated feeling.  Denies blood with passing stool.    Asking how to manage persistent constipation.    Message to KYLAH Valladares.

## 2019-02-08 NOTE — TELEPHONE ENCOUNTER
Called pt and advised per Migdalia NP that she would continue Linzess 290mcg daily and add some daily miralax and a stool softener if she has not already. Pt verb understanding and state she needs a script for the linzess 290mcg sent to her pharmacy.  Advised pt we will escribe this. Pt verb understanding.

## 2019-02-19 ENCOUNTER — OFFICE VISIT (OUTPATIENT)
Dept: FAMILY MEDICINE CLINIC | Facility: CLINIC | Age: 44
End: 2019-02-19

## 2019-02-19 VITALS
SYSTOLIC BLOOD PRESSURE: 133 MMHG | OXYGEN SATURATION: 100 % | WEIGHT: 179.1 LBS | BODY MASS INDEX: 30.58 KG/M2 | DIASTOLIC BLOOD PRESSURE: 88 MMHG | HEART RATE: 87 BPM | TEMPERATURE: 97.3 F | HEIGHT: 64 IN

## 2019-02-19 DIAGNOSIS — I10 ESSENTIAL HYPERTENSION: Primary | ICD-10-CM

## 2019-02-19 PROCEDURE — 99214 OFFICE O/P EST MOD 30 MIN: CPT | Performed by: FAMILY MEDICINE

## 2019-02-19 RX ORDER — HYDROCHLOROTHIAZIDE 12.5 MG/1
12.5 TABLET ORAL DAILY
Qty: 90 TABLET | Refills: 1 | Status: SHIPPED | OUTPATIENT
Start: 2019-02-19 | End: 2019-03-28 | Stop reason: SDUPTHER

## 2019-02-19 RX ORDER — ASPIRIN 81 MG/1
81 TABLET ORAL DAILY
COMMUNITY

## 2019-02-19 NOTE — PROGRESS NOTES
"Ofelia Holder is a 43 y.o. female.     Chief Complaint   Patient presents with   • Hypertension     she said that she can hear her heart beat in her ears   • Headache   • Ear Fullness     has trouble hearing out off her ears at times        History of Present Illness    Elevated blood pressure readings for the last 2 weeks.  Also in the past she has had some elevated readings.  There is a strong family history of hypertension.  She has prediabetes and taking metformin.  Weight has been unchanged.  The last couple weeks her blood pressures been about 130/140 over high 80s to low 90s.  She had a URI syndrome.  Was seen by urgent care a few days ago.  Was prescribed antibiotic and steroids.  She was describing eustachian tube dysfunction-like symptoms with fullness in her ears and hearing throbbing with her blood pressure and heart rate.  She has had some mild headaches but not severe in the last couple weeks.  There is been no fever.  No cardiovascular symptoms.  Her blood pressures been elevated in the past.      The following portions of the patient's history were reviewed and updated as appropriate: allergies, current medications, past family history, past medical history, past social history, past surgical history and problem list.          Review of Systems   Constitutional: Negative.    HENT: Positive for congestion.    Respiratory: Negative.    Cardiovascular: Negative.    Musculoskeletal: Negative.        Objective   Blood pressure 133/88, pulse 87, temperature 97.3 °F (36.3 °C), temperature source Oral, height 162.6 cm (64.02\"), weight 81.2 kg (179 lb 1.6 oz), last menstrual period 03/03/2016, SpO2 100 %.  Physical Exam   Constitutional: She appears well-developed and well-nourished. No distress.   No acute distress.  Nontoxic.   HENT:   Right Ear: Tympanic membrane, external ear and ear canal normal.   Left Ear: Tympanic membrane, external ear and ear canal normal.   Nose: Nose normal. "   Mouth/Throat: Oropharynx is clear and moist. No oropharyngeal exudate.   Eyes: Conjunctivae are normal. Right eye exhibits no discharge. Left eye exhibits no discharge. No scleral icterus.   Neck: No thyromegaly present.   Cardiovascular: Normal rate, regular rhythm, normal heart sounds and intact distal pulses.   Pulmonary/Chest: Effort normal and breath sounds normal. No stridor. No respiratory distress. She has no wheezes. She has no rales.   No tachypnea   Musculoskeletal: She exhibits no edema.   Lymphadenopathy:     She has no cervical adenopathy.   Skin: Skin is warm and dry. No rash noted.   Psychiatric: She has a normal mood and affect. Her behavior is normal. Judgment and thought content normal.   Nursing note and vitals reviewed.      Assessment/Plan   Noemi was seen today for hypertension, headache and ear fullness.    Diagnoses and all orders for this visit:    Essential hypertension    Other orders  -     hydrochlorothiazide (HYDRODIURIL) 12.5 MG tablet; Take 1 tablet by mouth Daily.        Essential hypertension.  New diagnosis.  No immediate workup needed.  I am seeing her back in April for recheck and annual wellness visit with lab work ordered.  We are starting hydrochlorothiazide 12.5 mg a day.  Side effects discussed.  She will monitor blood pressure and send me readings within 3 weeks.  We may need to double the dose of hydrochlorothiazide then.  The ear symptoms is likely related to eustachian tube dysfunction.  Likely no current evidence of sinusitis or other bacterial illness.  With severe symptoms she is going to let us know.

## 2019-02-21 ENCOUNTER — APPOINTMENT (OUTPATIENT)
Dept: CT IMAGING | Facility: HOSPITAL | Age: 44
End: 2019-02-21

## 2019-02-21 ENCOUNTER — HOSPITAL ENCOUNTER (EMERGENCY)
Facility: HOSPITAL | Age: 44
Discharge: HOME | End: 2019-02-21
Attending: EMERGENCY MEDICINE | Admitting: EMERGENCY MEDICINE

## 2019-02-21 VITALS
TEMPERATURE: 97 F | RESPIRATION RATE: 16 BRPM | BODY MASS INDEX: 30.03 KG/M2 | WEIGHT: 175.9 LBS | HEIGHT: 64 IN | HEART RATE: 68 BPM | OXYGEN SATURATION: 97 % | DIASTOLIC BLOOD PRESSURE: 89 MMHG | SYSTOLIC BLOOD PRESSURE: 141 MMHG

## 2019-02-21 DIAGNOSIS — R51.9 ACUTE NONINTRACTABLE HEADACHE, UNSPECIFIED HEADACHE TYPE: Primary | ICD-10-CM

## 2019-02-21 DIAGNOSIS — H93.A3 PULSATILE TINNITUS OF BOTH EARS: ICD-10-CM

## 2019-02-21 LAB
ANION GAP SERPL CALCULATED.3IONS-SCNC: 15.1 MMOL/L
BUN BLD-MCNC: 14 MG/DL (ref 6–20)
BUN/CREAT SERPL: 15.9 (ref 7–25)
CALCIUM SPEC-SCNC: 10.3 MG/DL (ref 8.6–10.5)
CHLORIDE SERPL-SCNC: 99 MMOL/L (ref 98–107)
CO2 SERPL-SCNC: 23.9 MMOL/L (ref 22–29)
CREAT BLD-MCNC: 0.88 MG/DL (ref 0.57–1)
DEPRECATED RDW RBC AUTO: 45.3 FL (ref 37–54)
EOSINOPHIL # BLD MANUAL: 0.04 10*3/MM3 (ref 0–0.4)
EOSINOPHIL NFR BLD MANUAL: 1 % (ref 0.3–6.2)
ERYTHROCYTE [DISTWIDTH] IN BLOOD BY AUTOMATED COUNT: 14.1 % (ref 12.3–15.4)
GFR SERPL CREATININE-BSD FRML MDRD: 85 ML/MIN/1.73
GLUCOSE BLD-MCNC: 95 MG/DL (ref 65–99)
HCG SERPL QL: NEGATIVE
HCT VFR BLD AUTO: 44.2 % (ref 34–46.6)
HGB BLD-MCNC: 14.8 G/DL (ref 12–15.9)
HOLD SPECIMEN: NORMAL
LYMPHOCYTES # BLD MANUAL: 3.05 10*3/MM3 (ref 0.7–3.1)
LYMPHOCYTES NFR BLD MANUAL: 68 % (ref 19.6–45.3)
LYMPHOCYTES NFR BLD MANUAL: 7 % (ref 5–12)
MCH RBC QN AUTO: 29.7 PG (ref 26.6–33)
MCHC RBC AUTO-ENTMCNC: 33.5 G/DL (ref 31.5–35.7)
MCV RBC AUTO: 88.6 FL (ref 79–97)
MONOCYTES # BLD AUTO: 0.31 10*3/MM3 (ref 0.1–0.9)
NEUTROPHILS # BLD AUTO: 0.99 10*3/MM3 (ref 1.4–7)
NEUTROPHILS NFR BLD MANUAL: 22 % (ref 42.7–76)
NRBC BLD AUTO-RTO: 0 /100 WBC (ref 0–0)
PLAT MORPH BLD: NORMAL
PLATELET # BLD AUTO: 243 10*3/MM3 (ref 140–450)
PMV BLD AUTO: 10.2 FL (ref 6–12)
POTASSIUM BLD-SCNC: 3.8 MMOL/L (ref 3.5–5.2)
RBC # BLD AUTO: 4.99 10*6/MM3 (ref 3.77–5.28)
RBC MORPH BLD: NORMAL
SODIUM BLD-SCNC: 138 MMOL/L (ref 136–145)
VARIANT LYMPHS NFR BLD MANUAL: 2 % (ref 0–5)
WBC MORPH BLD: NORMAL
WBC NRBC COR # BLD: 4.48 10*3/MM3 (ref 3.4–10.8)
WHOLE BLOOD HOLD SPECIMEN: NORMAL
WHOLE BLOOD HOLD SPECIMEN: NORMAL

## 2019-02-21 PROCEDURE — 70496 CT ANGIOGRAPHY HEAD: CPT

## 2019-02-21 PROCEDURE — 85025 COMPLETE CBC W/AUTO DIFF WBC: CPT | Performed by: EMERGENCY MEDICINE

## 2019-02-21 PROCEDURE — 99285 EMERGENCY DEPT VISIT HI MDM: CPT

## 2019-02-21 PROCEDURE — 84703 CHORIONIC GONADOTROPIN ASSAY: CPT | Performed by: EMERGENCY MEDICINE

## 2019-02-21 PROCEDURE — 25010000002 MAGNESIUM SULFATE 2 GM/50ML SOLUTION: Performed by: EMERGENCY MEDICINE

## 2019-02-21 PROCEDURE — 0 IOPAMIDOL PER 1 ML: Performed by: EMERGENCY MEDICINE

## 2019-02-21 PROCEDURE — 80048 BASIC METABOLIC PNL TOTAL CA: CPT | Performed by: EMERGENCY MEDICINE

## 2019-02-21 PROCEDURE — 85007 BL SMEAR W/DIFF WBC COUNT: CPT | Performed by: EMERGENCY MEDICINE

## 2019-02-21 PROCEDURE — 96365 THER/PROPH/DIAG IV INF INIT: CPT

## 2019-02-21 RX ORDER — SODIUM CHLORIDE 0.9 % (FLUSH) 0.9 %
10 SYRINGE (ML) INJECTION AS NEEDED
Status: DISCONTINUED | OUTPATIENT
Start: 2019-02-21 | End: 2019-02-21 | Stop reason: HOSPADM

## 2019-02-21 RX ORDER — MAGNESIUM SULFATE HEPTAHYDRATE 40 MG/ML
2 INJECTION, SOLUTION INTRAVENOUS ONCE
Status: COMPLETED | OUTPATIENT
Start: 2019-02-21 | End: 2019-02-21

## 2019-02-21 RX ORDER — AZITHROMYCIN 1 G
1 PACKET (EA) ORAL ONCE
COMMUNITY
End: 2019-03-17

## 2019-02-21 RX ORDER — UREA 10 %
3 LOTION (ML) TOPICAL NIGHTLY PRN
COMMUNITY

## 2019-02-21 RX ADMIN — MAGNESIUM SULFATE HEPTAHYDRATE 2 G: 40 INJECTION, SOLUTION INTRAVENOUS at 07:47

## 2019-02-21 RX ADMIN — METOPROLOL TARTRATE 25 MG: 25 TABLET ORAL at 07:48

## 2019-02-21 RX ADMIN — IOPAMIDOL 85 ML: 755 INJECTION, SOLUTION INTRAVENOUS at 08:41

## 2019-02-21 NOTE — ED NOTES
"Pt present with frontal headache and \"hearing my pulse in my ears\". Pt began taking HCTZ prescription on Tuesday and has since taken x2 doses as prescribed by PCP.  Pt has no cardiac Hx. Denies any other symptoms at this time.     Michelle De Dios RN  02/21/19 0710    "

## 2019-02-21 NOTE — ED TRIAGE NOTES
To ER via [facility].  C/o elevated blood pressure x 1 week. States hears a pulsating in ears.  Also c/o headache and nausea.  132/100 at 0300 this am. Pt saw PCP Tuesday and given HCTZ for elevated blood pressure.  Pt was diagnosed with a sinus infection on Sunday.  Denies antihistamine use.

## 2019-02-21 NOTE — ED PROVIDER NOTES
" EMERGENCY DEPARTMENT ENCOUNTER    Room Number:  13/13  Date seen:  2/21/2019  Time seen: 7:15 AM  PCP: Sher Babb MD  Historian: Pt      HPI:  Chief Complaint: HA  Context: Noemi Holder is a 43 y.o. female who presents to the ED c/o constant frontal HA for the last week. She notes that in addition to her HA she has been hearing a \"pulsing\" sound in her bilateral ears over the same time frame. She states her HA is not worse with changes in position, but it does worsen her \"pulsing\" sound in her ears. Pt also c/o congestion, sore throat, and nausea. Pt denies photophobia or phonophobia, fever, chills, neck pain, neck stiffness, seizures, visual disturbances, dysuria, abdominal pain, or changes in BM. Pt reports she was diagnosed with sinus infection 4 days ago and started on a zpak and medrol dosepak. She reports a hx of prediabetes (On metformin) and HTN (on HCTZ which she started 2 days ago). Pt denies exogenous hormone use, smoking or a hx of blood clots.     Pain Location: frontal head  Radiation: none  Quality: pain  Intensity/Severity: moderate  Duration: one week  Onset quality: gradual  Timing: constant  Progression: unchanged  Aggravating Factors: none  Alleviating Factors: none  Previous Episodes: Pt has a hx of HTN  Treatment before arrival: [place] [facility] and diagnosed with a sinus infection and started on zpak and dosepak  Associated Symptoms: tinnitus, congestion, sore throat, nausea      PAST MEDICAL HISTORY  Active Ambulatory Problems     Diagnosis Date Noted   • Irritable bowel syndrome with constipation 10/25/2018   • Impaired fasting glucose 10/25/2018   • Essential hypertension 02/19/2019     Resolved Ambulatory Problems     Diagnosis Date Noted   • Menorrhagia 03/08/2016   • Microcytic anemia 06/06/2016     Past Medical History:   Diagnosis Date   • Anemia    • Breakthrough bleeding on birth control pills    • Constipation    • Fibroids, subserous    • Hypertension    • " Lactose intolerance At age 3o   • PONV (postoperative nausea and vomiting)          PAST SURGICAL HISTORY  Past Surgical History:   Procedure Laterality Date   • CERVICAL CERCLAGE     • COLONOSCOPY  At age 38   • HAMMER TOE REPAIR     • MYOMECTOMY     • PARTIAL HYSTERECTOMY     • RI LAP, RADICAL HYST W/ TUBE&OV, NODE BX N/A 3/8/2016    Procedure: TOTAL LAPAROSCOPIC HYSTERECTOMY W/KOURTNEY SALPINGECTOMY, DILATATION AND CURETTAGE, CYSTOSCOPY;  Surgeon: Sophie Fleming MD;  Location: [facility];  Service: Gynecology   • UPPER GASTROINTESTINAL ENDOSCOPY  At age 38         FAMILY HISTORY  Family History   Problem Relation Age of Onset   • Hypertension Mother    • Hypertension Father    • Diabetes Father    • Diabetes Sister          SOCIAL HISTORY  Social History     Socioeconomic History   • Marital status:      Spouse name: Not on file   • Number of children: Not on file   • Years of education: Not on file   • Highest education level: Not on file   Social Needs   • Financial resource strain: Not on file   • Food insecurity - worry: Not on file   • Food insecurity - inability: Not on file   • Transportation needs - medical: Not on file   • Transportation needs - non-medical: Not on file   Occupational History   • Not on file   Tobacco Use   • Smoking status: Never Smoker   • Smokeless tobacco: Never Used   Substance and Sexual Activity   • Alcohol use: Yes     Types: 3 Glasses of wine per week     Comment: SOCIAL USE ...  6-8 drinks a week   • Drug use: No   • Sexual activity: Yes     Partners: Male     Birth control/protection: Condom   Other Topics Concern   • Not on file   Social History Narrative   • Not on file         ALLERGIES  Flagyl [metronidazole] and Penicillins        REVIEW OF SYSTEMS  Review of Systems   Constitutional: Negative for diaphoresis and fever.   HENT: Positive for congestion, sore throat and tinnitus (pulsatile).    Eyes: Negative for visual disturbance.   Respiratory: Negative for  shortness of breath.    Cardiovascular: Negative for palpitations.   Gastrointestinal: Positive for nausea. Negative for blood in stool and vomiting.   Endocrine: Negative for polyuria.   Genitourinary: Negative for flank pain.   Musculoskeletal: Negative for joint swelling.   Skin: Negative for wound.   Neurological: Positive for headaches. Negative for seizures.   Hematological: Negative for adenopathy.   Psychiatric/Behavioral: Negative for sleep disturbance.      PHYSICAL EXAM  ED Triage Vitals   Temp Heart Rate Resp BP SpO2   02/21/19 0554 02/21/19 0554 02/21/19 0554 02/21/19 0620 02/21/19 0554   96.7 °F (35.9 °C) 109 16 134/97 100 %      Temp src Heart Rate Source Patient Position BP Location FiO2 (%)   02/21/19 0554 02/21/19 0554 02/21/19 0620 02/21/19 0620 --   Tympanic Monitor Lying Right arm        GENERAL: no acute distress  HENT: nares patent, oropharynx is clear, TMs clear bilaterally  EYES: no scleral icterus, 4 mm and reactive bilaterally  NECK: no thyromegaly, no cervical adenopathy  CV: regular rhythm, normal rate  RESPIRATORY: normal effort, CTAB  ABDOMEN: soft, non-tender  MUSCULOSKELETAL: no deformity,no lower extremity edema  NEURO: alert, moves all extremities, follows commands  SKIN: warm, dry    Vital signs and nursing notes reviewed.          LAB RESULTS  Recent Results (from the past 24 hour(s))   Light Blue Top    Collection Time: 02/21/19  6:57 AM   Result Value Ref Range    Extra Tube hold for add-on    Green Top (Gel)    Collection Time: 02/21/19  6:57 AM   Result Value Ref Range    Extra Tube Hold for add-ons.    Lavender Top    Collection Time: 02/21/19  6:57 AM   Result Value Ref Range    Extra Tube hold for add-on    Basic Metabolic Panel    Collection Time: 02/21/19  6:57 AM   Result Value Ref Range    Glucose 95 65 - 99 mg/dL    BUN 14 6 - 20 mg/dL    Creatinine 0.88 0.57 - 1.00 mg/dL    Sodium 138 136 - 145 mmol/L    Potassium 3.8 3.5 - 5.2 mmol/L    Chloride 99 98 - 107 mmol/L     CO2 23.9 22.0 - 29.0 mmol/L    Calcium 10.3 8.6 - 10.5 mg/dL    eGFR  African Amer 85 >60 mL/min/1.73    BUN/Creatinine Ratio 15.9 7.0 - 25.0    Anion Gap 15.1 mmol/L   hCG, Serum, Qualitative    Collection Time: 02/21/19  6:57 AM   Result Value Ref Range    HCG Qualitative Negative Negative   CBC Auto Differential    Collection Time: 02/21/19  6:57 AM   Result Value Ref Range    WBC 4.48 3.40 - 10.80 10*3/mm3    RBC 4.99 3.77 - 5.28 10*6/mm3    Hemoglobin 14.8 12.0 - 15.9 g/dL    Hematocrit 44.2 34.0 - 46.6 %    MCV 88.6 79.0 - 97.0 fL    MCH 29.7 26.6 - 33.0 pg    MCHC 33.5 31.5 - 35.7 g/dL    RDW 14.1 12.3 - 15.4 %    RDW-SD 45.3 37.0 - 54.0 fl    MPV 10.2 6.0 - 12.0 fL    Platelets 243 140 - 450 10*3/mm3    nRBC 0.0 0.0 - 0.0 /100 WBC   Manual Differential    Collection Time: 02/21/19  6:57 AM   Result Value Ref Range    Neutrophil % 22.0 (L) 42.7 - 76.0 %    Lymphocyte % 68.0 (H) 19.6 - 45.3 %    Monocyte % 7.0 5.0 - 12.0 %    Eosinophil % 1.0 0.3 - 6.2 %    Atypical Lymphocyte % 2.0 0.0 - 5.0 %    Neutrophils Absolute 0.99 (C) 1.40 - 7.00 10*3/mm3    Lymphocytes Absolute 3.05 0.70 - 3.10 10*3/mm3    Monocytes Absolute 0.31 0.10 - 0.90 10*3/mm3    Eosinophils Absolute 0.04 0.00 - 0.40 10*3/mm3    RBC Morphology Normal Normal    WBC Morphology Normal Normal    Platelet Morphology Normal Normal       Ordered the above labs and reviewed the results.        RADIOLOGY  Ct Angiogram Head With & Without Contrast    Result Date: 2/21/2019  CT VENOGRAM WITH CONTRAST  CLINICAL HISTORY: Severe headache. Pulsatile tinnitus.  TECHNIQUE: CT scan of the head was obtained with 3 m axial images before and after the administration of IV contrast. A CT venogram was performed with 1 mm axial images with sagittal, coronal, and 3-dimensional reconstructed images.  FINDINGS:  The ventricles, sulci, and cisterns are age appropriate. There are no abnormal areas of contrast enhancement. The basal ganglia and thalami are unremarkable  in appearance. The posterior fossa structures are within normal limits.  There is normal appearance of the transverse sinuses, sigmoid sinuses, superior sagittal sinus, straight sinus, and internal cerebral veins. There is no evidence for dural sinus thrombosis.       Unremarkable CT venogram of the head.  Radiation dose reduction techniques were utilized, including automated exposure control and exposure modulation based on body size.         Ordered the above noted radiological studies. Reviewed by me in PACS.     PROCEDURES  Procedures      MEDICATIONS GIVEN IN ER  Medications   metoprolol tartrate (LOPRESSOR) tablet 25 mg (25 mg Oral Given 2/21/19 0794)   magnesium sulfate 2g/50 mL (PREMIX) infusion (0 g Intravenous Stopped 2/21/19 0807)   iopamidol (ISOVUE-370) 76 % injection 100 mL (85 mL Intravenous Given by Other 2/21/19 0841)         PROGRESS AND CONSULTS       0728 - Magnesium sulfate and lopressor ordered. CTV head and BMP ordered for further evaluation.     0737 - hCG ordered for further evaluation.    1045 - Rechecked pt. Most recent BP was 132/98. Pt reports her HA and tinnitus have improved. Informed pt of the results of her lab work and imaging studies including negative CTV head. Stated the plan to discharge home with a follow up with PCP and neurology. Pt understands and agrees with plan. All questions answered.     MEDICAL DECISION MAKING    43-year-old female with reported headache for 1 week in the setting of recent sinus infection.  She also reports bilateral pulsatile tinnitus.  She has a normal neurologic exam.  CT venogram of the brain is negative for acute intracranial abnormality, specifically there is no evidence of venous thrombosis.  Blood pressure is only mildly elevated here.  She did get some dramatic relief with the above medications.  I recommended follow-up with her PCP for blood pressure recheck in 1 week and also follow-up with neurology as needed regarding her headache and  tinnitus.  Return precautions were discussed.    MDM  Number of Diagnoses or Management Options     Amount and/or Complexity of Data Reviewed  Clinical lab tests: ordered and reviewed (Creatinine - 0.88  Hemoglobin - 14.8)  Tests in the radiology section of CPT®: ordered and reviewed (CTV head - Unremarkable CT venogram of the head)  Decide to obtain previous medical records or to obtain history from someone other than the patient: yes  Review and summarize past medical records: yes (Reviewed pt's most recent [facility] visit)               DIAGNOSIS  Final diagnoses:   Acute nonintractable headache, unspecified headache type   Pulsatile tinnitus of both ears         DISPOSITION  DISCHARGE    Patient discharged in stable condition.    Reviewed implications of results, diagnosis, meds, responsibility to follow up, warning signs and symptoms of possible worsening, potential complications and reasons to return to ER.    Patient/Family voiced understanding of above instructions.    Discussed plan for discharge, as there is no emergent indication for admission. Patient referred to primary care provider for BP management due to today's BP. Pt/family is agreeable and understands need for follow up and repeat testing.  Pt is aware that discharge does not mean that nothing is wrong but it indicates no emergency is present that requires admission and they must continue care with follow-up as given below or physician of their choice.     FOLLOW-UP  Sher Babb MD  [address]  [facility] [address]  [facility] [id number] [phone]    In 1 week  for BP recheck    Darío Kumar MD  [address]  [place] [address]  [phone]      As needed         Medication List      No changes were made to your prescriptions during this visit.       Latest Documented Vital Signs:  As of 1:27 PM  BP- 141/89 HR- 68 Temp- 97 °F (36.1 °C) (Oral) O2 sat- 97%        --  Documentation assistance provided by hollie Alejo for Dr. FISHER  Omi Champion MD.  Information recorded by the scribe was done at my direction and has been verified and validated by me.     Linwood Alejo  02/21/19 1112       Franck Champion MD  02/21/19 4919

## 2019-03-28 RX ORDER — HYDROCHLOROTHIAZIDE 12.5 MG/1
12.5 TABLET ORAL DAILY
Qty: 90 TABLET | Refills: 1 | Status: SHIPPED | OUTPATIENT
Start: 2019-03-28 | End: 2019-04-23

## 2019-03-28 RX ORDER — METFORMIN HYDROCHLORIDE 500 MG/1
500 TABLET, EXTENDED RELEASE ORAL
Qty: 90 TABLET | Refills: 1 | Status: SHIPPED | OUTPATIENT
Start: 2019-03-28 | End: 2019-10-11 | Stop reason: SDUPTHER

## 2019-04-16 LAB
ALBUMIN SERPL-MCNC: 5 G/DL (ref 3.5–5.2)
ALBUMIN/GLOB SERPL: 1.6 G/DL
ALP SERPL-CCNC: 54 U/L (ref 39–117)
ALT SERPL-CCNC: 12 U/L (ref 1–33)
AST SERPL-CCNC: 12 U/L (ref 1–32)
BILIRUB SERPL-MCNC: 0.4 MG/DL (ref 0.2–1.2)
BUN SERPL-MCNC: 11 MG/DL (ref 6–20)
BUN/CREAT SERPL: 12.5 (ref 7–25)
CALCIUM SERPL-MCNC: 10.2 MG/DL (ref 8.6–10.5)
CHLORIDE SERPL-SCNC: 100 MMOL/L (ref 98–107)
CO2 SERPL-SCNC: 27.3 MMOL/L (ref 22–29)
CREAT SERPL-MCNC: 0.88 MG/DL (ref 0.57–1)
GLOBULIN SER CALC-MCNC: 3.2 GM/DL
GLUCOSE SERPL-MCNC: 98 MG/DL (ref 65–99)
HBA1C MFR BLD: 5.7 % (ref 4.8–5.6)
POTASSIUM SERPL-SCNC: 4.1 MMOL/L (ref 3.5–5.2)
PROT SERPL-MCNC: 8.2 G/DL (ref 6–8.5)
SODIUM SERPL-SCNC: 138 MMOL/L (ref 136–145)

## 2019-04-23 ENCOUNTER — OFFICE VISIT (OUTPATIENT)
Dept: FAMILY MEDICINE CLINIC | Facility: CLINIC | Age: 44
End: 2019-04-23

## 2019-04-23 VITALS
OXYGEN SATURATION: 100 % | HEIGHT: 64 IN | HEART RATE: 97 BPM | WEIGHT: 177.5 LBS | SYSTOLIC BLOOD PRESSURE: 125 MMHG | TEMPERATURE: 97.8 F | BODY MASS INDEX: 30.3 KG/M2 | DIASTOLIC BLOOD PRESSURE: 89 MMHG

## 2019-04-23 DIAGNOSIS — R73.01 IMPAIRED FASTING GLUCOSE: ICD-10-CM

## 2019-04-23 DIAGNOSIS — Z00.00 HEALTH CARE MAINTENANCE: ICD-10-CM

## 2019-04-23 DIAGNOSIS — I10 ESSENTIAL HYPERTENSION: Primary | ICD-10-CM

## 2019-04-23 PROCEDURE — 99213 OFFICE O/P EST LOW 20 MIN: CPT | Performed by: FAMILY MEDICINE

## 2019-04-23 RX ORDER — CHLORTHALIDONE 25 MG/1
12.5 TABLET ORAL DAILY
Qty: 45 TABLET | Refills: 1 | Status: SHIPPED | OUTPATIENT
Start: 2019-04-23 | End: 2019-05-28
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
## 2019-05-23 ENCOUNTER — RESULTS ENCOUNTER (OUTPATIENT)
Dept: FAMILY MEDICINE CLINIC | Facility: CLINIC | Age: 44
End: 2019-05-23

## 2019-05-23 DIAGNOSIS — R73.01 IMPAIRED FASTING GLUCOSE: ICD-10-CM

## 2019-05-28 RX ORDER — HYDROCHLOROTHIAZIDE 12.5 MG/1
12.5 TABLET ORAL DAILY
Qty: 90 TABLET | Refills: 1 | Status: SHIPPED | OUTPATIENT
Start: 2019-05-28 | End: 2019-10-11

## 2019-07-09 RX ORDER — LINACLOTIDE 290 UG/1
CAPSULE, GELATIN COATED ORAL
Qty: 30 CAPSULE | Refills: 0 | Status: SHIPPED | OUTPATIENT
Start: 2019-07-09 | End: 2019-08-26 | Stop reason: SDUPTHER

## 2019-07-22 ENCOUNTER — RESULTS ENCOUNTER (OUTPATIENT)
Dept: FAMILY MEDICINE CLINIC | Facility: CLINIC | Age: 44
End: 2019-07-22

## 2019-07-22 DIAGNOSIS — I10 ESSENTIAL HYPERTENSION: ICD-10-CM

## 2019-07-22 DIAGNOSIS — Z00.00 HEALTH CARE MAINTENANCE: ICD-10-CM

## 2019-07-22 DIAGNOSIS — R73.01 IMPAIRED FASTING GLUCOSE: ICD-10-CM

## 2019-08-27 RX ORDER — LINACLOTIDE 290 UG/1
CAPSULE, GELATIN COATED ORAL
Qty: 30 CAPSULE | Refills: 11 | Status: SHIPPED | OUTPATIENT
Start: 2019-08-27 | End: 2021-09-14

## 2019-09-20 RX ORDER — LINACLOTIDE 290 UG/1
CAPSULE, GELATIN COATED ORAL
Qty: 30 CAPSULE | Refills: 0 | OUTPATIENT
Start: 2019-09-20

## 2019-09-24 ENCOUNTER — TELEPHONE (OUTPATIENT)
Dept: GASTROENTEROLOGY | Facility: CLINIC | Age: 44
End: 2019-09-24

## 2019-09-24 NOTE — TELEPHONE ENCOUNTER
----- Message from Allyssa Beyer sent at 9/24/2019 10:38 AM EDT -----  Regarding: Deisy  Contact: [phone]  Pt is calling because the pharmacy told the pt she needed to call her provider.

## 2019-09-24 NOTE — TELEPHONE ENCOUNTER
See note of 9/12.    Call to [facility] @ [phone] and spoke with Gauri. Escribe not received.  Order linzess 290 mcg 1 cap po daily, #30, R11.  R&V.    Call to pt to advise of above. Verb understanding.

## 2019-10-05 LAB
ALBUMIN SERPL-MCNC: 4.5 G/DL (ref 3.5–5.2)
ALBUMIN/GLOB SERPL: 1.5 G/DL
ALP SERPL-CCNC: 52 U/L (ref 39–117)
ALT SERPL-CCNC: 15 U/L (ref 1–33)
AST SERPL-CCNC: 18 U/L (ref 1–32)
BASOPHILS # BLD AUTO: (no result) 10*3/UL
BILIRUB SERPL-MCNC: 0.4 MG/DL (ref 0.2–1.2)
BUN SERPL-MCNC: 9 MG/DL (ref 6–20)
BUN/CREAT SERPL: 10.8 (ref 7–25)
CALCIUM SERPL-MCNC: 9.5 MG/DL (ref 8.6–10.5)
CHLORIDE SERPL-SCNC: 103 MMOL/L (ref 98–107)
CHOLEST SERPL-MCNC: 151 MG/DL (ref 0–200)
CO2 SERPL-SCNC: 26.9 MMOL/L (ref 22–29)
CREAT SERPL-MCNC: 0.83 MG/DL (ref 0.57–1)
DIFFERENTIAL COMMENT: ABNORMAL
EOSINOPHIL # BLD AUTO: (no result) 10*3/UL
EOSINOPHIL # BLD MANUAL: 0.08 10*3/MM3 (ref 0–0.4)
EOSINOPHIL NFR BLD AUTO: (no result) %
EOSINOPHIL NFR BLD MANUAL: 1.9 % (ref 0.3–6.2)
ERYTHROCYTE [DISTWIDTH] IN BLOOD BY AUTOMATED COUNT: 13.2 % (ref 12.3–15.4)
GLOBULIN SER CALC-MCNC: 3.1 GM/DL
GLUCOSE SERPL-MCNC: 103 MG/DL (ref 65–99)
HBA1C MFR BLD: 5.9 % (ref 4.8–5.6)
HCT VFR BLD AUTO: 40.1 % (ref 34–46.6)
HDLC SERPL-MCNC: 78 MG/DL (ref 40–60)
HGB BLD-MCNC: 13.2 G/DL (ref 12–15.9)
LDLC SERPL CALC-MCNC: 62 MG/DL (ref 0–100)
LYMPHOCYTES # BLD AUTO: (no result) 10*3/UL
LYMPHOCYTES # BLD MANUAL: 2.62 10*3/MM3 (ref 0.7–3.1)
LYMPHOCYTES NFR BLD AUTO: (no result) %
LYMPHOCYTES NFR BLD MANUAL: 60.2 % (ref 19.6–45.3)
MCH RBC QN AUTO: 29.7 PG (ref 26.6–33)
MCHC RBC AUTO-ENTMCNC: 32.9 G/DL (ref 31.5–35.7)
MCV RBC AUTO: 90.1 FL (ref 79–97)
MONOCYTES # BLD MANUAL: 0.3 10*3/MM3 (ref 0.1–0.9)
MONOCYTES NFR BLD AUTO: (no result) %
MONOCYTES NFR BLD MANUAL: 6.8 % (ref 5–12)
NEUTROPHILS # BLD MANUAL: 1.36 10*3/MM3 (ref 1.7–7)
NEUTROPHILS NFR BLD AUTO: (no result) %
NEUTROPHILS NFR BLD MANUAL: 31.1 % (ref 42.7–76)
PLATELET # BLD AUTO: 243 10*3/MM3 (ref 140–450)
PLATELET BLD QL SMEAR: ABNORMAL
POTASSIUM SERPL-SCNC: 4.3 MMOL/L (ref 3.5–5.2)
PROT SERPL-MCNC: 7.6 G/DL (ref 6–8.5)
RBC # BLD AUTO: 4.45 10*6/MM3 (ref 3.77–5.28)
RBC MORPH BLD: ABNORMAL
SODIUM SERPL-SCNC: 141 MMOL/L (ref 136–145)
TRIGL SERPL-MCNC: 53 MG/DL (ref 0–150)
VLDLC SERPL CALC-MCNC: 10.6 MG/DL
WBC # BLD AUTO: 4.36 10*3/MM3 (ref 3.4–10.8)

## 2019-10-11 ENCOUNTER — OFFICE VISIT (OUTPATIENT)
Dept: FAMILY MEDICINE CLINIC | Facility: CLINIC | Age: 44
End: 2019-10-11

## 2019-10-11 VITALS
HEIGHT: 65 IN | OXYGEN SATURATION: 99 % | BODY MASS INDEX: 31.22 KG/M2 | TEMPERATURE: 98.3 F | HEART RATE: 56 BPM | WEIGHT: 187.4 LBS | SYSTOLIC BLOOD PRESSURE: 127 MMHG | DIASTOLIC BLOOD PRESSURE: 94 MMHG

## 2019-10-11 DIAGNOSIS — R73.01 IMPAIRED FASTING GLUCOSE: ICD-10-CM

## 2019-10-11 DIAGNOSIS — I10 ESSENTIAL HYPERTENSION: Primary | ICD-10-CM

## 2019-10-11 DIAGNOSIS — Z23 NEED FOR IMMUNIZATION AGAINST INFLUENZA: ICD-10-CM

## 2019-10-11 PROCEDURE — 90674 CCIIV4 VAC NO PRSV 0.5 ML IM: CPT | Performed by: FAMILY MEDICINE

## 2019-10-11 PROCEDURE — 90471 IMMUNIZATION ADMIN: CPT | Performed by: FAMILY MEDICINE

## 2019-10-11 PROCEDURE — 99213 OFFICE O/P EST LOW 20 MIN: CPT | Performed by: FAMILY MEDICINE

## 2019-10-11 RX ORDER — AMLODIPINE BESYLATE 5 MG/1
5 TABLET ORAL DAILY
Qty: 90 TABLET | Refills: 1 | Status: SHIPPED | OUTPATIENT
Start: 2019-10-11 | End: 2019-10-16 | Stop reason: SDUPTHER

## 2019-10-11 RX ORDER — HYDROCHLOROTHIAZIDE 12.5 MG/1
12.5 TABLET ORAL DAILY
Qty: 90 TABLET | Refills: 1 | Status: CANCELLED | OUTPATIENT
Start: 2019-10-11

## 2019-10-11 RX ORDER — METFORMIN HYDROCHLORIDE 500 MG/1
500 TABLET, EXTENDED RELEASE ORAL
Qty: 90 TABLET | Refills: 1 | Status: SHIPPED | OUTPATIENT
Start: 2019-10-11 | End: 2019-10-16 | Stop reason: SDUPTHER

## 2019-10-11 RX ORDER — FLUTICASONE PROPIONATE 50 MCG
1 SPRAY, SUSPENSION (ML) NASAL AS NEEDED
COMMUNITY
Start: 2019-05-15

## 2019-10-11 RX ORDER — GUAIFENESIN 600 MG/1
600 TABLET, EXTENDED RELEASE ORAL DAILY PRN
COMMUNITY
Start: 2019-05-15 | End: 2020-05-14

## 2019-10-11 NOTE — PROGRESS NOTES
"Ofelia GEORGE Still is a 43 y.o. female.     Hypertension (follow up labs)    History of Present Illness    Hypertension follow up. Doing well with current medication which she is taking as directed.  Fairly well controlled.  Systolic has been normal at home.  Diastolic runs between the mid 80s and low 90s.  She has trouble with the hydrochlorothiazide.  It is causing increased urination it becomes problematic at times.  No cardiovascular or neurological symptoms. Today's BP: 127/94.      Lab Results   Component Value Date    CHLPL 151 10/04/2019    TRIG 53 10/04/2019    HDL 78 (H) 10/04/2019    LDL 62 10/04/2019     Impaired fasting glucose.  A1c less than 6.  Recent fasting glucose minimally elevated 103.  She tolerates the metformin.  There is strong family history of diabetes.  She continues the medication and recent A1c is improved compared to maximum 6%.      The following portions of the patient's history were reviewed and updated as appropriate: allergies, current medications, past family history, past medical history, past social history, past surgical history and problem list.      Review of Systems   Constitutional: Negative.    Respiratory: Negative.    Cardiovascular: Negative.    Musculoskeletal: Negative.        Objective   Blood pressure 127/94, pulse 56, temperature 98.3 °F (36.8 °C), temperature source Oral, height 165.1 cm (65\"), weight 85 kg (187 lb 6.4 oz), last menstrual period 03/03/2016, SpO2 99 %.  Physical Exam   Constitutional: She appears well-developed and well-nourished. No distress.   Neck: No thyromegaly present.   Cardiovascular: Normal rate, regular rhythm, normal heart sounds and intact distal pulses.   Pulmonary/Chest: Effort normal and breath sounds normal.   Musculoskeletal: She exhibits no edema.   Skin: Skin is warm and dry.   Psychiatric: She has a normal mood and affect. Her behavior is normal. Judgment and thought content normal.   Nursing note and vitals " reviewed.      Assessment/Plan   Noemi was seen today for hypertension.    Diagnoses and all orders for this visit:    Essential hypertension    Impaired fasting glucose    Other orders  -     Cancel: hydroCHLOROthiazide (HYDRODIURIL) 12.5 MG tablet; Take 1 tablet by mouth Daily.  -     metFORMIN ER (GLUCOPHAGE-XR) 500 MG 24 hr tablet; Take 1 tablet by mouth Daily With Breakfast.  -     amLODIPine (NORVASC) 5 MG tablet; Take 1 tablet by mouth Daily.        Hypertension.  Overall well controlled but the diastolic remains still slightly high, but she is having some urinary issues with the hydrochlorothiazide.  Discontinue hydrochlorothiazide.  I am sending amlodipine 5 mg a to her mail order.  Side effects discussed including ankle swelling.  If she is not tolerating the amlodipine I would switch to an ARB.  She is going to send me blood pressure readings within a few weeks.  I will see her back in 3 months for recheck, however if well-controlled we can push it out the 6 months.    Impaired fasting glucose.  A1c remains less than 6%.  She continues metformin.  She is planning getting back in the workout routine soon.

## 2019-10-16 RX ORDER — METFORMIN HYDROCHLORIDE 500 MG/1
500 TABLET, EXTENDED RELEASE ORAL
Qty: 90 TABLET | Refills: 1 | Status: SHIPPED | OUTPATIENT
Start: 2019-10-16 | End: 2020-06-09

## 2019-10-16 RX ORDER — AMLODIPINE BESYLATE 5 MG/1
5 TABLET ORAL DAILY
Qty: 90 TABLET | Refills: 1 | Status: SHIPPED | OUTPATIENT
Start: 2019-10-16 | End: 2020-01-14

## 2020-01-06 ENCOUNTER — APPOINTMENT (OUTPATIENT)
Dept: WOMENS IMAGING | Facility: HOSPITAL | Age: 45
End: 2020-01-06

## 2020-01-06 PROCEDURE — 77067 SCR MAMMO BI INCL CAD: CPT | Performed by: RADIOLOGY

## 2020-01-06 PROCEDURE — 77063 BREAST TOMOSYNTHESIS BI: CPT | Performed by: RADIOLOGY

## 2020-01-14 ENCOUNTER — OFFICE VISIT (OUTPATIENT)
Dept: FAMILY MEDICINE CLINIC | Facility: CLINIC | Age: 45
End: 2020-01-14

## 2020-01-14 VITALS
WEIGHT: 191.6 LBS | TEMPERATURE: 98.3 F | HEART RATE: 90 BPM | OXYGEN SATURATION: 98 % | HEIGHT: 65 IN | BODY MASS INDEX: 31.92 KG/M2 | DIASTOLIC BLOOD PRESSURE: 83 MMHG | SYSTOLIC BLOOD PRESSURE: 116 MMHG

## 2020-01-14 DIAGNOSIS — I10 ESSENTIAL HYPERTENSION: Primary | ICD-10-CM

## 2020-01-14 PROCEDURE — 99213 OFFICE O/P EST LOW 20 MIN: CPT | Performed by: FAMILY MEDICINE

## 2020-01-14 RX ORDER — LOSARTAN POTASSIUM 50 MG/1
50 TABLET ORAL DAILY
Qty: 90 TABLET | Refills: 1 | Status: SHIPPED | OUTPATIENT
Start: 2020-01-14 | End: 2020-08-03

## 2020-01-14 NOTE — PROGRESS NOTES
"Ofelia Holder is a 44 y.o. female.     Chief Complaint   Patient presents with   • Hypertension     pt is not fasting   • Headache     follow up         Hypertension   This is a recurrent problem. The current episode started more than 1 year ago. The problem has been waxing and waning since onset. The problem is resistant. Pertinent negatives include no anxiety, blurred vision, chest pain, neck pain, orthopnea, palpitations, peripheral edema, PND, shortness of breath or sweats. Agents associated with hypertension include decongestants. Compliance problems include diet.    Headache    Pertinent negatives include no blurred vision or neck pain.     Follow-up hypertension.  Predominantly diastolic hypertension.  She is not had much change in blood pressure on amlodipine 5 mg a day but it is causing malaise and some mild headaches.  No ankle swelling.  She took her mother's 20 mg of lisinopril last night.  And her blood pressure came down to normal the next morning.  She is otherwise doing well.  She is going to [place] next week.  The hydrochlorothiazide caused too much urination.        The following portions of the patient's history were reviewed and updated as appropriate: allergies, current medications, past family history, past medical history, past social history, past surgical history and problem list.          Review of Systems   Constitutional: Positive for fatigue.   Eyes: Negative for blurred vision.   Respiratory: Negative for shortness of breath.    Cardiovascular: Negative for chest pain, palpitations, orthopnea and PND.   Musculoskeletal: Negative for neck pain.       Objective   Blood pressure 116/83, pulse 90, temperature 98.3 °F (36.8 °C), temperature source Oral, height 165.1 cm (65\"), weight 86.9 kg (191 lb 9.6 oz), last menstrual period 03/03/2016, SpO2 98 %.  Physical Exam   Constitutional: She appears well-developed and well-nourished. No distress.   Neck: No thyromegaly " present.   Cardiovascular: Normal rate, regular rhythm, normal heart sounds and intact distal pulses.   Pulmonary/Chest: Effort normal and breath sounds normal.   Musculoskeletal: She exhibits no edema.   Skin: Skin is warm and dry.   Psychiatric: She has a normal mood and affect. Her behavior is normal. Judgment and thought content normal.   Nursing note and vitals reviewed.      Assessment/Plan   Noemi was seen today for hypertension and headache.    Diagnoses and all orders for this visit:    Essential hypertension    Other orders  -     losartan (COZAAR) 50 MG tablet; Take 1 tablet by mouth Daily.        Hypertension.  Stop amlodipine.  Causing fatigue and headaches.  Start losartan 50 mg a day.  Were holding off ACE inhibitor because she is concerned about risk of angioedema.  Less risk of angioedema with losartan.  She should start this medication after she gets back from [place].  Otherwise continue amlodipine for now.  I will see her back in 3 months for recheck which is going to send me readings within 3 weeks with report.

## 2020-06-09 RX ORDER — METFORMIN HYDROCHLORIDE 500 MG/1
500 TABLET, EXTENDED RELEASE ORAL
Qty: 90 TABLET | Refills: 0 | Status: SHIPPED | OUTPATIENT
Start: 2020-06-09 | End: 2020-08-03

## 2020-08-03 RX ORDER — METFORMIN HYDROCHLORIDE 500 MG/1
500 TABLET, EXTENDED RELEASE ORAL
Qty: 90 TABLET | Refills: 1 | Status: SHIPPED | OUTPATIENT
Start: 2020-08-03 | End: 2021-04-28

## 2020-08-03 RX ORDER — LOSARTAN POTASSIUM 50 MG/1
50 TABLET ORAL DAILY
Qty: 90 TABLET | Refills: 1 | Status: SHIPPED | OUTPATIENT
Start: 2020-08-03 | End: 2020-10-27

## 2020-10-27 ENCOUNTER — OFFICE VISIT (OUTPATIENT)
Dept: FAMILY MEDICINE CLINIC | Facility: CLINIC | Age: 45
End: 2020-10-27

## 2020-10-27 VITALS
WEIGHT: 195.4 LBS | HEART RATE: 87 BPM | BODY MASS INDEX: 32.55 KG/M2 | OXYGEN SATURATION: 99 % | DIASTOLIC BLOOD PRESSURE: 90 MMHG | TEMPERATURE: 97.7 F | SYSTOLIC BLOOD PRESSURE: 134 MMHG | HEIGHT: 65 IN

## 2020-10-27 DIAGNOSIS — I10 ESSENTIAL HYPERTENSION: Primary | ICD-10-CM

## 2020-10-27 DIAGNOSIS — R73.01 IMPAIRED FASTING GLUCOSE: ICD-10-CM

## 2020-10-27 DIAGNOSIS — Z23 NEED FOR VACCINATION: ICD-10-CM

## 2020-10-27 PROCEDURE — 90471 IMMUNIZATION ADMIN: CPT | Performed by: FAMILY MEDICINE

## 2020-10-27 PROCEDURE — 99213 OFFICE O/P EST LOW 20 MIN: CPT | Performed by: FAMILY MEDICINE

## 2020-10-27 PROCEDURE — 90686 IIV4 VACC NO PRSV 0.5 ML IM: CPT | Performed by: FAMILY MEDICINE

## 2020-10-27 RX ORDER — LISINOPRIL AND HYDROCHLOROTHIAZIDE 20; 12.5 MG/1; MG/1
1 TABLET ORAL DAILY
Qty: 90 TABLET | Refills: 1 | Status: SHIPPED | OUTPATIENT
Start: 2020-10-27 | End: 2021-06-30 | Stop reason: SDUPTHER

## 2020-10-28 LAB
ALBUMIN SERPL-MCNC: 4.7 G/DL (ref 3.8–4.8)
ALBUMIN/GLOB SERPL: 1.5 {RATIO} (ref 1.2–2.2)
ALP SERPL-CCNC: 64 IU/L (ref 39–117)
ALT SERPL-CCNC: 12 IU/L (ref 0–32)
AST SERPL-CCNC: 13 IU/L (ref 0–40)
BILIRUB SERPL-MCNC: 0.2 MG/DL (ref 0–1.2)
BUN SERPL-MCNC: 13 MG/DL (ref 6–24)
BUN/CREAT SERPL: 16 (ref 9–23)
CALCIUM SERPL-MCNC: 9.7 MG/DL (ref 8.7–10.2)
CHLORIDE SERPL-SCNC: 104 MMOL/L (ref 96–106)
CHOLEST SERPL-MCNC: 161 MG/DL (ref 100–199)
CO2 SERPL-SCNC: 25 MMOL/L (ref 20–29)
CREAT SERPL-MCNC: 0.82 MG/DL (ref 0.57–1)
GLOBULIN SER CALC-MCNC: 3.1 G/DL (ref 1.5–4.5)
GLUCOSE SERPL-MCNC: 77 MG/DL (ref 65–99)
HBA1C MFR BLD: 5.9 % (ref 4.8–5.6)
HDLC SERPL-MCNC: 66 MG/DL
LDLC SERPL CALC-MCNC: 81 MG/DL (ref 0–99)
POTASSIUM SERPL-SCNC: 4.5 MMOL/L (ref 3.5–5.2)
PROT SERPL-MCNC: 7.8 G/DL (ref 6–8.5)
SODIUM SERPL-SCNC: 140 MMOL/L (ref 134–144)
TRIGL SERPL-MCNC: 72 MG/DL (ref 0–149)
VLDLC SERPL CALC-MCNC: 14 MG/DL (ref 5–40)

## 2020-10-28 RX ORDER — LINACLOTIDE 290 UG/1
CAPSULE, GELATIN COATED ORAL
Qty: 30 CAPSULE | Refills: 11 | OUTPATIENT
Start: 2020-10-28

## 2020-10-28 NOTE — PROGRESS NOTES
The fasting glucose is improved.  The hemoglobin A1c is unchanged.  Continue current therapy.  Cholesterol looks overall stable.

## 2021-04-06 ENCOUNTER — BULK ORDERING (OUTPATIENT)
Dept: CASE MANAGEMENT | Facility: OTHER | Age: 46
End: 2021-04-06

## 2021-04-06 DIAGNOSIS — Z23 IMMUNIZATION DUE: ICD-10-CM

## 2021-04-09 ENCOUNTER — APPOINTMENT (OUTPATIENT)
Dept: WOMENS IMAGING | Facility: HOSPITAL | Age: 46
End: 2021-04-09

## 2021-04-09 PROCEDURE — 77067 SCR MAMMO BI INCL CAD: CPT | Performed by: RADIOLOGY

## 2021-04-09 PROCEDURE — 77063 BREAST TOMOSYNTHESIS BI: CPT | Performed by: RADIOLOGY

## 2021-04-22 DIAGNOSIS — Z00.00 HEALTHCARE MAINTENANCE: ICD-10-CM

## 2021-04-22 DIAGNOSIS — R73.01 IMPAIRED FASTING GLUCOSE: ICD-10-CM

## 2021-04-22 DIAGNOSIS — I10 ESSENTIAL HYPERTENSION: Primary | ICD-10-CM

## 2021-04-26 LAB
ALBUMIN SERPL-MCNC: 4.5 G/DL (ref 3.5–5.2)
ALBUMIN/GLOB SERPL: 1.6 G/DL
ALP SERPL-CCNC: 53 U/L (ref 39–117)
ALT SERPL-CCNC: 12 U/L (ref 1–33)
AST SERPL-CCNC: 12 U/L (ref 1–32)
BILIRUB SERPL-MCNC: 0.4 MG/DL (ref 0–1.2)
BUN SERPL-MCNC: 10 MG/DL (ref 6–20)
BUN/CREAT SERPL: 11.5 (ref 7–25)
CALCIUM SERPL-MCNC: 9.4 MG/DL (ref 8.6–10.5)
CHLORIDE SERPL-SCNC: 103 MMOL/L (ref 98–107)
CHOLEST SERPL-MCNC: 138 MG/DL (ref 0–200)
CO2 SERPL-SCNC: 23.6 MMOL/L (ref 22–29)
CREAT SERPL-MCNC: 0.87 MG/DL (ref 0.57–1)
DIFFERENTIAL COMMENT: ABNORMAL
ERYTHROCYTE [DISTWIDTH] IN BLOOD BY AUTOMATED COUNT: 13.6 % (ref 12.3–15.4)
GLOBULIN SER CALC-MCNC: 2.9 GM/DL
GLUCOSE SERPL-MCNC: 95 MG/DL (ref 65–99)
HBA1C MFR BLD: 6.3 % (ref 4.8–5.6)
HCT VFR BLD AUTO: 38.4 % (ref 34–46.6)
HCV AB S/CO SERPL IA: <0.1 S/CO RATIO (ref 0–0.9)
HDLC SERPL-MCNC: 62 MG/DL (ref 40–60)
HGB BLD-MCNC: 12.6 G/DL (ref 12–15.9)
LDLC SERPL CALC-MCNC: 64 MG/DL (ref 0–100)
LYMPHOCYTES # BLD MANUAL: 2.43 10*3/MM3 (ref 0.7–3.1)
LYMPHOCYTES NFR BLD MANUAL: 50.5 % (ref 19.6–45.3)
MCH RBC QN AUTO: 30.1 PG (ref 26.6–33)
MCHC RBC AUTO-ENTMCNC: 32.8 G/DL (ref 31.5–35.7)
MCV RBC AUTO: 91.6 FL (ref 79–97)
MONOCYTES # BLD MANUAL: 0.4 10*3/MM3 (ref 0.1–0.9)
MONOCYTES NFR BLD MANUAL: 8.4 % (ref 5–12)
NEUTROPHILS # BLD MANUAL: 1.98 10*3/MM3 (ref 1.7–7)
NEUTROPHILS NFR BLD MANUAL: 41.1 % (ref 42.7–76)
NRBC BLD AUTO-RTO: 0 /100 WBC (ref 0–0.2)
PLATELET # BLD AUTO: 257 10*3/MM3 (ref 140–450)
PLATELET BLD QL SMEAR: ABNORMAL
POTASSIUM SERPL-SCNC: 4.2 MMOL/L (ref 3.5–5.2)
PROT SERPL-MCNC: 7.4 G/DL (ref 6–8.5)
RBC # BLD AUTO: 4.19 10*6/MM3 (ref 3.77–5.28)
RBC MORPH BLD: ABNORMAL
SODIUM SERPL-SCNC: 140 MMOL/L (ref 136–145)
TRIGL SERPL-MCNC: 59 MG/DL (ref 0–150)
VLDLC SERPL CALC-MCNC: 12 MG/DL (ref 5–40)
WBC # BLD AUTO: 4.81 10*3/MM3 (ref 3.4–10.8)

## 2021-04-27 ENCOUNTER — OFFICE VISIT (OUTPATIENT)
Dept: FAMILY MEDICINE CLINIC | Facility: CLINIC | Age: 46
End: 2021-04-27

## 2021-04-27 VITALS
WEIGHT: 191.1 LBS | TEMPERATURE: 97.5 F | OXYGEN SATURATION: 100 % | HEART RATE: 99 BPM | BODY MASS INDEX: 31.84 KG/M2 | SYSTOLIC BLOOD PRESSURE: 120 MMHG | DIASTOLIC BLOOD PRESSURE: 86 MMHG | HEIGHT: 65 IN

## 2021-04-27 DIAGNOSIS — R73.01 IMPAIRED FASTING GLUCOSE: ICD-10-CM

## 2021-04-27 DIAGNOSIS — Z00.00 HEALTH CARE MAINTENANCE: Primary | ICD-10-CM

## 2021-04-27 DIAGNOSIS — I10 ESSENTIAL HYPERTENSION: ICD-10-CM

## 2021-04-27 DIAGNOSIS — K58.1 IRRITABLE BOWEL SYNDROME WITH CONSTIPATION: ICD-10-CM

## 2021-04-27 PROCEDURE — 99396 PREV VISIT EST AGE 40-64: CPT | Performed by: FAMILY MEDICINE

## 2021-04-27 RX ORDER — OSPEMIFENE 60 MG/1
1 TABLET, FILM COATED ORAL DAILY
COMMUNITY
Start: 2021-04-12

## 2021-04-27 NOTE — PROGRESS NOTES
"Chief Complaint  Annual Exam    Subjective          Noemi GEORGE Still presents to [facility]  History of Present Illness    Annual health care maintenance visit.  Also history of hypertension and impaired fasting glucose.  A1c is up slightly.  However fasting glucose is normal.  Lipid panel overall favorable although the HDL \"good\" cholesterol is lower, she is not exercising as much.  She has had some stressors with work and home.  She continues graduate school.  She states she eats comfort food sometimes.  She still exercises about 3 times a week.  Treadmill and other exercises.  Review of systems otherwise negative.  She has ongoing IBS with constipation.  She continues Linzess.  She states she does not take her Metformin every day.  She is on aspirin 81 mg a day at the request of her gynecologist because of her Osphena which may potentially increased risk of thrombosis.    Objective   Vital Signs:   /86   Pulse 99   Temp 97.5 °F (36.4 °C) (Temporal)   Ht 165.1 cm (65\")   Wt 86.7 kg (191 lb 1.6 oz)   SpO2 100%   BMI 31.80 kg/m²     Physical Exam  Constitutional:       Appearance: Normal appearance.   HENT:      Head: Atraumatic.   Eyes:      Conjunctiva/sclera: Conjunctivae normal.   Cardiovascular:      Rate and Rhythm: Normal rate and regular rhythm.      Pulses: Normal pulses.      Heart sounds: Normal heart sounds.   Pulmonary:      Effort: Pulmonary effort is normal.      Breath sounds: Normal breath sounds.   Abdominal:      General: Abdomen is flat. There is no distension.      Palpations: Abdomen is soft. There is no mass.      Tenderness: There is no abdominal tenderness.      Hernia: No hernia is present.   Musculoskeletal:         General: Normal range of motion.      Cervical back: Normal range of motion and neck supple. No muscular tenderness.   Lymphadenopathy:      Cervical: No cervical adenopathy.   Skin:     General: Skin is warm and dry.      Findings: " No rash.   Neurological:      General: No focal deficit present.      Mental Status: She is alert and oriented to person, place, and time.   Psychiatric:         Mood and Affect: Mood normal.         Behavior: Behavior normal.        Result Review :   The following data was reviewed by: Sher Babb MD on 04/27/2021:  Common labs    Common Labsle 10/27/20 10/27/20 10/27/20 4/23/21 4/23/21 4/23/21 4/23/21    1608 1608 1608 0942 0942 0942 0942   Glucose  77   95     BUN  13   10     Creatinine  0.82   0.87     eGFR Non  Am  87   70     eGFR  Am  101   85     Sodium  140   140     Potassium  4.5   4.2     Chloride  104   103     Calcium  9.7   9.4     Total Protein  7.8   7.4     Albumin  4.7   4.50     Total Bilirubin  0.2   0.4     Alkaline Phosphatase  64   53     AST (SGOT)  13   12     ALT (SGPT)  12   12     WBC    4.81      Hemoglobin    12.6      Hematocrit    38.4      Platelets    257      Total Cholesterol   161    138   Triglycerides   72    59   HDL Cholesterol   66    62 (A)   LDL Cholesterol    81    64   Hemoglobin A1C 5.9 (A)     6.30 (A)    (A) Abnormal value       Comments are available for some flowsheets but are not being displayed.                     Assessment and Plan    Diagnoses and all orders for this visit:    1. Health care maintenance (Primary)    2. Essential hypertension    3. Impaired fasting glucose    4. Irritable bowel syndrome with constipation      Annual healthcare maintenance visit.    She received her second: Vaccination today.    Breast cancer screening.  Through her gynecologist.    Colon cancer screening.  Recommend screening colonoscopy.  Guideline changes discussed.  She is going to contact her gastroenterologist.  She will check with her insurance company.    Impaired fasting glucose.  She will continue Metformin.  I recommend taking it every day.  We will monitor A1c and other lab work prior to next visit in 6 months.    Hypertension.  Well-controlled.   She is tolerating the medication.    Preventative health practices discussed.  I will see her back sooner as needed otherwise 6 months      Follow Up   No follow-ups on file.  Patient was given instructions and counseling regarding her condition or for health maintenance advice. Please see specific information pulled into the AVS if appropriate.

## 2021-04-28 RX ORDER — METFORMIN HYDROCHLORIDE 500 MG/1
TABLET, EXTENDED RELEASE ORAL
Qty: 90 TABLET | Refills: 1 | Status: SHIPPED | OUTPATIENT
Start: 2021-04-28 | End: 2021-12-03 | Stop reason: SDUPTHER

## 2021-06-30 DIAGNOSIS — Z11.1 SCREENING FOR TUBERCULOSIS: ICD-10-CM

## 2021-06-30 DIAGNOSIS — Z11.59 NEED FOR HEPATITIS B SCREENING TEST: Primary | ICD-10-CM

## 2021-06-30 RX ORDER — LISINOPRIL AND HYDROCHLOROTHIAZIDE 20; 12.5 MG/1; MG/1
1 TABLET ORAL DAILY
Qty: 90 TABLET | Refills: 1 | Status: SHIPPED | OUTPATIENT
Start: 2021-06-30 | End: 2022-04-26

## 2021-07-02 DIAGNOSIS — R73.01 IMPAIRED FASTING GLUCOSE: ICD-10-CM

## 2021-07-02 DIAGNOSIS — I10 ESSENTIAL HYPERTENSION: ICD-10-CM

## 2021-07-02 DIAGNOSIS — K58.1 IRRITABLE BOWEL SYNDROME WITH CONSTIPATION: ICD-10-CM

## 2021-09-14 ENCOUNTER — OFFICE VISIT (OUTPATIENT)
Dept: GASTROENTEROLOGY | Facility: CLINIC | Age: 46
End: 2021-09-14

## 2021-09-14 VITALS — WEIGHT: 197.5 LBS | BODY MASS INDEX: 32.9 KG/M2 | TEMPERATURE: 97.5 F | HEIGHT: 65 IN

## 2021-09-14 DIAGNOSIS — K58.1 IRRITABLE BOWEL SYNDROME WITH CONSTIPATION: Primary | ICD-10-CM

## 2021-09-14 DIAGNOSIS — Z12.11 ENCOUNTER FOR SCREENING FOR MALIGNANT NEOPLASM OF COLON: ICD-10-CM

## 2021-09-14 PROCEDURE — 99204 OFFICE O/P NEW MOD 45 MIN: CPT | Performed by: INTERNAL MEDICINE

## 2021-09-14 RX ORDER — PLECANATIDE 3 MG/1
3 TABLET ORAL DAILY
Qty: 90 TABLET | Refills: 3 | Status: SHIPPED | OUTPATIENT
Start: 2021-09-14

## 2021-09-14 NOTE — PROGRESS NOTES
Subjective   Chief Complaint   Patient presents with   • Colonoscopy   • Constipation       Noemi Abernathy is a  45 y.o. female here for constipation.  All problems are new to me today.    Ms. abernathy is known to me from previous evaluations.  Last seen by me in 2017.  She has a history of constipation predominant irritable bowel.  She has struggled with this her entire life.  She has been on Linzess 290 mcg for 2 years now.  She does feel like it helps but she still only has a bowel movement 2-3 times a week and she has a sense of incomplete evacuation.  She is try to add MiraLAX to this regimen with no improvement.  She has been on this medication and no other prescription medications in the past.  She started with a lower dose and aggressively worked her way up to the highest dose.  She sees no blood in her stool.  She has no abdominal pain.  She does experience fullness and bloating when she has not had a bowel movement in several days and she sometimes feels nauseated.    She had a diagnostic colonoscopy at age 38 due to left lower quadrant pain.  She had no polyps.  She has no family history of colon cancer or polyps.  HPI  Past Medical History:   Diagnosis Date   • Anemia    • Breakthrough bleeding on birth control pills    • Constipation    • Fibroids, subserous    • Hypertension    • Lactose intolerance At age 3o   • PONV (postoperative nausea and vomiting)      Past Surgical History:   Procedure Laterality Date   • CERVICAL CERCLAGE     • COLONOSCOPY  At age 38   • HAMMER TOE REPAIR     • MYOMECTOMY     • AZ LAP, RADICAL HYST W/ TUBE & OV, NODE BX N/A 3/8/2016    Procedure: TOTAL LAPAROSCOPIC HYSTERECTOMY W/KOURTNEY SALPINGECTOMY, DILATATION AND CURETTAGE, CYSTOSCOPY;  Surgeon: Sophie Fleming MD;  Location: [facility];  Service: Gynecology   • SUBTOTAL HYSTERECTOMY     • UPPER GASTROINTESTINAL ENDOSCOPY  At age 38       Current Outpatient Medications:   •  acetaminophen (TYLENOL) 500 MG tablet, Take 500  mg by mouth Every 8 (Eight) Hours As Needed., Disp: , Rfl:   •  aspirin 81 MG EC tablet, Take 81 mg by mouth Daily., Disp: , Rfl:   •  fluticasone (FLONASE) 50 MCG/ACT nasal spray, 1 spray into the nostril(s) as directed by provider Daily., Disp: , Rfl:   •  lisinopril-hydrochlorothiazide (PRINZIDE,ZESTORETIC) 20-12.5 MG per tablet, Take 1 tablet by mouth Daily., Disp: 90 tablet, Rfl: 1  •  melatonin 1 MG tablet, Take 3 mg by mouth At Night As Needed for Sleep., Disp: , Rfl:   •  metFORMIN ER (GLUCOPHAGE-XR) 500 MG 24 hr tablet, TAKE 1 TABLET DAILY WITH   BREAKFAST, Disp: 90 tablet, Rfl: 1  •  Multiple Vitamins-Minerals (HAIR SKIN NAILS PO), Take  by mouth., Disp: , Rfl:   •  valACYclovir (VALTREX) 500 MG tablet, Take 500 mg by mouth Daily., Disp: , Rfl:   •  Osphena 60 MG tablet, Take 1 tablet by mouth Daily., Disp: , Rfl:   •  Plecanatide (Trulance) 3 MG tablet, Take 1 tablet by mouth Daily., Disp: 90 tablet, Rfl: 3  PRN Meds:.  Allergies   Allergen Reactions   • Flagyl [Metronidazole] Hives and Shortness Of Breath   • Penicillins Hives and Shortness Of Breath     Social History     Socioeconomic History   • Marital status:      Spouse name: Not on file   • Number of children: Not on file   • Years of education: Not on file   • Highest education level: Not on file   Tobacco Use   • Smoking status: Never Smoker   • Smokeless tobacco: Never Used   Substance and Sexual Activity   • Alcohol use: Yes     Types: 3 Glasses of wine per week     Comment: SOCIAL USE ...  6-8 drinks a week   • Drug use: No   • Sexual activity: Yes     Partners: Male     Birth control/protection: Condom     Family History   Problem Relation Age of Onset   • Hypertension Mother    • Hypertension Father    • Diabetes Father    • Diabetes Sister      Review of Systems   Constitutional: Negative for appetite change and unexpected weight change.   Gastrointestinal: Positive for constipation. Negative for blood in stool.     Vitals:     09/14/21 1438   Temp: 97.5 °F (36.4 °C)         09/14/21  1438   Weight: 89.6 kg (197 lb 8 oz)       Objective   Physical Exam  Constitutional:       Appearance: Normal appearance.   Abdominal:      General: There is no distension.      Palpations: Abdomen is soft.   Neurological:      Mental Status: She is alert.       No radiology results for the last 7 days    Assessment/Plan   Diagnoses and all orders for this visit:    Irritable bowel syndrome with constipation  -     Cancel: Case Request; Standing  -     Cancel: Case Request    Encounter for screening for malignant neoplasm of colon  -     Case Request; Standing  -     Case Request    Other orders  -     Plecanatide (Trulance) 3 MG tablet; Take 1 tablet by mouth Daily.  -     Cancel: Follow Anesthesia Guidelines / Standing Orders; Future  -     Cancel: Obtain Informed Consent; Future  -     Cancel: Implement Anesthesia orders day of procedure.; Standing  -     Cancel: Obtain informed consent; Standing  -     Cancel: Verify bowel prep was successful; Standing  -     Cancel: Give tap water enema if bowel prep was insufficient; Standing  -     Follow Anesthesia Guidelines / Standing Orders; Future  -     Obtain Informed Consent; Future  -     Implement Anesthesia orders day of procedure.; Standing  -     Obtain informed consent; Standing  -     Verify bowel prep was successful; Standing  -     Give tap water enema if bowel prep was insufficient; Standing      Plan:  · She has persistent symptoms despite high-dose Linzess.  Recommend trial of Trulance.  Have given her samples and a coupon today and sent this to her pharmacy to start the PA process.  Discussed how to use this medication with her.  · recommend diet lifestyle modifications to improve bowel function   · she is due for screening colonoscopy based on her age and we will Schedule this at her convenience

## 2021-09-20 ENCOUNTER — TELEPHONE (OUTPATIENT)
Dept: GASTROENTEROLOGY | Facility: CLINIC | Age: 46
End: 2021-09-20

## 2021-09-20 NOTE — TELEPHONE ENCOUNTER
PA was submitted for Trulance via [facility]. Prior auth was approved and scanned into chart.

## 2021-10-07 ENCOUNTER — TELEPHONE (OUTPATIENT)
Dept: GASTROENTEROLOGY | Facility: CLINIC | Age: 46
End: 2021-10-07

## 2021-10-07 NOTE — TELEPHONE ENCOUNTER
CS at [facility] 12/17/27 arrive at 10:30am. Spoke with Laya. [facility]

## 2021-10-08 LAB
ALBUMIN SERPL-MCNC: 4.8 G/DL (ref 3.5–5.2)
ALBUMIN/GLOB SERPL: 1.5 G/DL
ALP SERPL-CCNC: 52 U/L (ref 39–117)
ALT SERPL-CCNC: 15 U/L (ref 1–33)
AST SERPL-CCNC: 15 U/L (ref 1–32)
BILIRUB SERPL-MCNC: 0.3 MG/DL (ref 0–1.2)
BUN SERPL-MCNC: 11 MG/DL (ref 6–20)
BUN/CREAT SERPL: 12.9 (ref 7–25)
CALCIUM SERPL-MCNC: 10.1 MG/DL (ref 8.6–10.5)
CHLORIDE SERPL-SCNC: 99 MMOL/L (ref 98–107)
CHOLEST SERPL-MCNC: 161 MG/DL (ref 0–200)
CO2 SERPL-SCNC: 26.6 MMOL/L (ref 22–29)
CREAT SERPL-MCNC: 0.85 MG/DL (ref 0.57–1)
GLOBULIN SER CALC-MCNC: 3.1 GM/DL
GLUCOSE SERPL-MCNC: 87 MG/DL (ref 65–99)
HBA1C MFR BLD: 6 % (ref 4.8–5.6)
HDLC SERPL-MCNC: 69 MG/DL (ref 40–60)
LDLC SERPL CALC-MCNC: 79 MG/DL (ref 0–100)
POTASSIUM SERPL-SCNC: 4.1 MMOL/L (ref 3.5–5.2)
PROT SERPL-MCNC: 7.9 G/DL (ref 6–8.5)
SODIUM SERPL-SCNC: 136 MMOL/L (ref 136–145)
TRIGL SERPL-MCNC: 66 MG/DL (ref 0–150)
VLDLC SERPL CALC-MCNC: 13 MG/DL (ref 5–40)

## 2021-10-26 ENCOUNTER — OFFICE VISIT (OUTPATIENT)
Dept: FAMILY MEDICINE CLINIC | Facility: CLINIC | Age: 46
End: 2021-10-26

## 2021-10-26 VITALS
SYSTOLIC BLOOD PRESSURE: 119 MMHG | TEMPERATURE: 97.3 F | BODY MASS INDEX: 32.36 KG/M2 | WEIGHT: 194.2 LBS | RESPIRATION RATE: 16 BRPM | HEIGHT: 65 IN | HEART RATE: 99 BPM | DIASTOLIC BLOOD PRESSURE: 87 MMHG | OXYGEN SATURATION: 98 %

## 2021-10-26 DIAGNOSIS — I10 ESSENTIAL HYPERTENSION: Chronic | ICD-10-CM

## 2021-10-26 DIAGNOSIS — Z23 NEED FOR VACCINATION: ICD-10-CM

## 2021-10-26 DIAGNOSIS — R73.01 IMPAIRED FASTING GLUCOSE: Primary | Chronic | ICD-10-CM

## 2021-10-26 PROCEDURE — 90686 IIV4 VACC NO PRSV 0.5 ML IM: CPT | Performed by: FAMILY MEDICINE

## 2021-10-26 PROCEDURE — 90471 IMMUNIZATION ADMIN: CPT | Performed by: FAMILY MEDICINE

## 2021-10-26 PROCEDURE — 99213 OFFICE O/P EST LOW 20 MIN: CPT | Performed by: FAMILY MEDICINE

## 2021-10-26 NOTE — PROGRESS NOTES
"Chief Complaint  Hypertension (follow up)    Subjective          Noemi GEORGE Still presents to [facility]  History of Present Illness     Hypertension.  She continues lisinopril hydrochlorothiazide.  Blood pressure running normal here.    Impaired fasting glucose.  Her A1c is improved.  Down to 6% from previous 6.3%.  She continues Metformin extended release 500 mg a day.  She states it makes her feel hungry at times.  However she has lost a little bit of weight.  She continues to follow with her gastroenterologist for chronic constipation.  He has a colonoscopy coming up soon.  She has had some increased stressors she states.  Not eating as well given her work and school obligations.    Objective   Vital Signs:   /87   Pulse 99   Temp 97.3 °F (36.3 °C) (Temporal)   Resp 16   Ht 165.1 cm (65\")   Wt 88.1 kg (194 lb 3.2 oz)   SpO2 98%   BMI 32.32 kg/m²     Physical Exam  Vitals and nursing note reviewed.   Constitutional:       General: She is not in acute distress.     Appearance: She is well-developed.   Cardiovascular:      Rate and Rhythm: Normal rate and regular rhythm.      Heart sounds: Normal heart sounds.   Pulmonary:      Effort: Pulmonary effort is normal.      Breath sounds: Normal breath sounds.   Skin:     General: Skin is warm and dry.   Psychiatric:         Behavior: Behavior normal.         Thought Content: Thought content normal.         Judgment: Judgment normal.        Result Review :                 Assessment and Plan    Diagnoses and all orders for this visit:    1. Impaired fasting glucose (Primary)  -     Hemoglobin A1c; Future  -     Comprehensive Metabolic Panel; Future  -     Lipid Panel; Future    2. Need for vaccination  -     FluLaval/Fluarix/Fluzone >6 Months (1778-0922)    3. Essential hypertension  -     Comprehensive Metabolic Panel; Future  -     Lipid Panel; Future      Impaired fasting glucose with no evidence of diabetes criteria.  " Continue Metformin extended release 500 mg a day.  She is having some increased appetite from that she states, but her weight is down.  Continue same for now.  I am concerned about progression of diabetes type 2, especially given the fact that she is been working very hard and not eating as well.  I will see her back in 6 months for annual complete physical examination with lab work prior.    Hypertension.  Well-controlled.      Follow Up   No follow-ups on file.  Patient was given instructions and counseling regarding her condition or for health maintenance advice. Please see specific information pulled into the AVS if appropriate.

## 2021-12-03 RX ORDER — METFORMIN HYDROCHLORIDE 500 MG/1
500 TABLET, EXTENDED RELEASE ORAL
Qty: 90 TABLET | Refills: 1 | Status: SHIPPED | OUTPATIENT
Start: 2021-12-03 | End: 2022-04-26

## 2021-12-03 NOTE — TELEPHONE ENCOUNTER
Patients insurance switched so they no longer receive meds mail order. Patient needs Metformin sent to local pharmacy already in chart.

## 2021-12-10 ENCOUNTER — TELEPHONE (OUTPATIENT)
Dept: GASTROENTEROLOGY | Facility: CLINIC | Age: 46
End: 2021-12-10

## 2021-12-10 NOTE — TELEPHONE ENCOUNTER
Received fax from [employer] advising that pt's trulance is not covered per insurance.     Called pt , vm full

## 2021-12-13 ENCOUNTER — TELEPHONE (OUTPATIENT)
Dept: GASTROENTEROLOGY | Facility: CLINIC | Age: 46
End: 2021-12-13

## 2021-12-13 NOTE — TELEPHONE ENCOUNTER
Called pt and pt reports that her insurance changed in Oct to [employer] ( card scanned under media tab).  She states that her truance needs a pa.  Advised will send message to MA's regarding pa.  Verb understanding.

## 2021-12-13 NOTE — TELEPHONE ENCOUNTER
Prior authorization for Trulance has been submitted via [facility]. Prior authorization was approved. Approval scanned into chart

## 2021-12-14 ENCOUNTER — LAB REQUISITION (OUTPATIENT)
Dept: LAB | Facility: HOSPITAL | Age: 46
End: 2021-12-14

## 2021-12-14 DIAGNOSIS — Z00.00 ENCOUNTER FOR GENERAL ADULT MEDICAL EXAMINATION WITHOUT ABNORMAL FINDINGS: ICD-10-CM

## 2021-12-14 LAB — SARS-COV-2 ORF1AB RESP QL NAA+PROBE: NOT DETECTED

## 2021-12-14 PROCEDURE — U0004 COV-19 TEST NON-CDC HGH THRU: HCPCS | Performed by: INTERNAL MEDICINE

## 2021-12-17 ENCOUNTER — OUTSIDE FACILITY SERVICE (OUTPATIENT)
Dept: GASTROENTEROLOGY | Facility: CLINIC | Age: 46
End: 2021-12-17

## 2021-12-17 PROCEDURE — 45378 DIAGNOSTIC COLONOSCOPY: CPT | Performed by: INTERNAL MEDICINE

## 2021-12-29 ENCOUNTER — TELEPHONE (OUTPATIENT)
Dept: GASTROENTEROLOGY | Facility: CLINIC | Age: 46
End: 2021-12-29

## 2021-12-29 NOTE — TELEPHONE ENCOUNTER
----- Message from Anuradha Jennings MD sent at 12/26/2021  4:27 PM EST -----  Regarding: recall  Please have patient in recall for colonoscopy in 10 years  ----- Message -----  From: Clay Card Incoming  Sent: 12/22/2021   3:06 PM EST  To: Anuradha Jennings MD

## 2022-04-15 ENCOUNTER — APPOINTMENT (OUTPATIENT)
Dept: WOMENS IMAGING | Facility: HOSPITAL | Age: 47
End: 2022-04-15

## 2022-04-15 PROCEDURE — 77067 SCR MAMMO BI INCL CAD: CPT | Performed by: RADIOLOGY

## 2022-04-15 PROCEDURE — 77063 BREAST TOMOSYNTHESIS BI: CPT | Performed by: RADIOLOGY

## 2022-04-26 ENCOUNTER — OFFICE VISIT (OUTPATIENT)
Dept: FAMILY MEDICINE CLINIC | Facility: CLINIC | Age: 47
End: 2022-04-26

## 2022-04-26 VITALS
HEART RATE: 95 BPM | TEMPERATURE: 97 F | DIASTOLIC BLOOD PRESSURE: 82 MMHG | RESPIRATION RATE: 12 BRPM | WEIGHT: 187.2 LBS | SYSTOLIC BLOOD PRESSURE: 125 MMHG | BODY MASS INDEX: 31.19 KG/M2 | HEIGHT: 65 IN | OXYGEN SATURATION: 98 %

## 2022-04-26 DIAGNOSIS — R73.01 IMPAIRED FASTING GLUCOSE: Chronic | ICD-10-CM

## 2022-04-26 DIAGNOSIS — Z00.00 HEALTH CARE MAINTENANCE: Primary | ICD-10-CM

## 2022-04-26 DIAGNOSIS — I10 ESSENTIAL HYPERTENSION: Chronic | ICD-10-CM

## 2022-04-26 PROCEDURE — 99214 OFFICE O/P EST MOD 30 MIN: CPT | Performed by: FAMILY MEDICINE

## 2022-04-26 PROCEDURE — 99396 PREV VISIT EST AGE 40-64: CPT | Performed by: FAMILY MEDICINE

## 2022-04-26 RX ORDER — LISINOPRIL 20 MG/1
20 TABLET ORAL DAILY
Qty: 90 TABLET | Refills: 1 | Status: SHIPPED | OUTPATIENT
Start: 2022-04-26 | End: 2023-01-03 | Stop reason: SDUPTHER

## 2022-04-26 RX ORDER — HYDROCHLOROTHIAZIDE 12.5 MG/1
12.5 TABLET ORAL DAILY
Qty: 90 TABLET | Refills: 1 | Status: SHIPPED | OUTPATIENT
Start: 2022-04-26 | End: 2023-01-03 | Stop reason: SDUPTHER

## 2022-04-26 NOTE — PROGRESS NOTES
"Chief Complaint  Annual Exam (physical)    Subjective          Noemi GEORGE Still presents to [facility]  History of Present Illness     Annual healthcare maintenance visit.  Never smoker.  She has a couple drinks on weekends but otherwise no alcohol use no history of heavy alcohol use.  She gets some exercise.  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  She states she is graduating this summer.    Hypertension.  She continues combo lisinopril hydrochlorothiazide.  She would like to divide the pill into 2 separate medications.  She is going on a cruise in a few weeks and would like to hold off on hydrochlorothiazide if dehydrated.  From the heat.    Impaired fasting glucose, the impaired fasting glucose is resolved but the A1c remains elevated.  She does not like the way the metformin makes her feel.  She feels like she cannot quite concentrate as well on it.  She did some experiments and felt much better off of it.  Her A1c is stable at 6.0% and her fasting glucose remains completely normal.    IBS.  She continues with her gastroenterologist.    Social History     Tobacco Use   • Smoking status: Never Smoker   • Smokeless tobacco: Never Used   Substance Use Topics   • Alcohol use: Yes     Types: 3 Glasses of wine per week     Comment: SOCIAL USE ...  6-8 drinks a week   • Drug use: No     Family History   Problem Relation Age of Onset   • Hypertension Mother    • Hypertension Father    • Diabetes Father    • Diabetes Sister          Objective   Vital Signs:   /82   Pulse 95   Temp 97 °F (36.1 °C) (Infrared)   Resp 12   Ht 165.1 cm (65\")   Wt 84.9 kg (187 lb 3.2 oz)   SpO2 98%   BMI 31.15 kg/m²     Physical Exam  Constitutional:       Appearance: Normal appearance.   HENT:      Head: Atraumatic.      Mouth/Throat:      Pharynx: Oropharynx is clear. No oropharyngeal exudate or posterior oropharyngeal erythema.   Eyes: "      Conjunctiva/sclera: Conjunctivae normal.   Cardiovascular:      Rate and Rhythm: Normal rate and regular rhythm.      Pulses: Normal pulses.      Heart sounds: Normal heart sounds.   Pulmonary:      Effort: Pulmonary effort is normal.      Breath sounds: Normal breath sounds.   Abdominal:      General: Abdomen is flat. There is no distension.      Palpations: Abdomen is soft. There is no mass.      Tenderness: There is no abdominal tenderness.      Hernia: No hernia is present.   Musculoskeletal:         General: Normal range of motion.      Cervical back: Normal range of motion and neck supple. No muscular tenderness.   Lymphadenopathy:      Cervical: No cervical adenopathy.   Skin:     General: Skin is warm and dry.   Neurological:      General: No focal deficit present.      Mental Status: She is alert and oriented to person, place, and time.   Psychiatric:         Mood and Affect: Mood normal.        Result Review :   The following data was reviewed by: Sher Babb MD on 04/26/2022:  Common labs    Common Labsle 10/7/21 10/7/21 10/7/21 4/19/22 4/19/22 4/19/22    1209 1209 1209 1022 1022 1022   Glucose  87   69    BUN  11   10    Creatinine  0.85   0.91    eGFR Non  Am  72       eGFR African Am  88       Sodium  136   140    Potassium  4.1   4.0    Chloride  99   103    Calcium  10.1   9.8    Total Protein  7.9   7.4    Albumin  4.80   4.7    Total Bilirubin  0.3   0.3    Alkaline Phosphatase  52   52    AST (SGOT)  15   14    ALT (SGPT)  15   13    Total Cholesterol 161     161   Triglycerides 66     105   HDL Cholesterol 69 (A)     71   LDL Cholesterol  79     71   Hemoglobin A1C   6.00 (A) 6.0 (A)     (A) Abnormal value       Comments are available for some flowsheets but are not being displayed.                     Assessment and Plan    Diagnoses and all orders for this visit:    1. Health care maintenance (Primary)    2. Essential hypertension  -     Hemoglobin A1c; Future  -      Comprehensive Metabolic Panel; Future    3. Impaired fasting glucose  -     Hemoglobin A1c; Future  -     Comprehensive Metabolic Panel; Future    Other orders  -     lisinopril (PRINIVIL,ZESTRIL) 20 MG tablet; Take 1 tablet by mouth Daily.  Dispense: 90 tablet; Refill: 1  -     hydroCHLOROthiazide (HYDRODIURIL) 12.5 MG tablet; Take 1 tablet by mouth Daily.  Dispense: 90 tablet; Refill: 1      Annual healthcare maintenance visit.    Immunizations.  I do recommend she get the third COVID-19 vaccine.  We discussed this in some detail.    Hypertension.  Controlled.  Okay to break the combo pill in the 2.  I am prescribing lisinopril and hydrochlorothiazide separately.  She is encouraged to check her blood pressure at home.  I will see her back in 6 months for recheck.    Impaired fasting glucose.  The fasting glucose has improved but her A1c remains elevated at 6%.  She is not tolerating the metformin and there is no absolute indication to take it.  She is going to stop metformin.  If her A1c goes up to consider a GLP-1 agonist.    Colon cancer screening up-to-date.    Cervical cancer screening up-to-date.    Skin cancer screening.  She has what looks like a dermatofibroma on her left pretibial surface.  States is been there for a number of months if not years.  It is a darkly pigmented uniform size and round less than 1 cm papule with a positive pucker sign.  However I do recommend she get to a dermatologist.  She asked for name I gave her a local dermatologist she can see.  If she needs a direct referral she can let us know.    Preventative health practices discussed including regular exercise.  I will see her back in 6 months sooner as needed             Follow Up   No follow-ups on file.  Patient was given instructions and counseling regarding her condition or for health maintenance advice. Please see specific information pulled into the AVS if appropriate.

## 2022-11-02 ENCOUNTER — OFFICE VISIT (OUTPATIENT)
Dept: FAMILY MEDICINE CLINIC | Facility: CLINIC | Age: 47
End: 2022-11-02

## 2022-11-02 VITALS
DIASTOLIC BLOOD PRESSURE: 87 MMHG | HEIGHT: 65 IN | SYSTOLIC BLOOD PRESSURE: 116 MMHG | OXYGEN SATURATION: 99 % | TEMPERATURE: 96.9 F | WEIGHT: 189.8 LBS | BODY MASS INDEX: 31.62 KG/M2 | HEART RATE: 86 BPM

## 2022-11-02 DIAGNOSIS — R73.01 IMPAIRED FASTING GLUCOSE: Chronic | ICD-10-CM

## 2022-11-02 DIAGNOSIS — Z23 INFLUENZA VACCINE ADMINISTERED: ICD-10-CM

## 2022-11-02 DIAGNOSIS — Z00.00 HEALTH CARE MAINTENANCE: ICD-10-CM

## 2022-11-02 DIAGNOSIS — I10 ESSENTIAL HYPERTENSION: Primary | Chronic | ICD-10-CM

## 2022-11-02 PROCEDURE — 90471 IMMUNIZATION ADMIN: CPT | Performed by: FAMILY MEDICINE

## 2022-11-02 PROCEDURE — 99213 OFFICE O/P EST LOW 20 MIN: CPT | Performed by: FAMILY MEDICINE

## 2022-11-02 PROCEDURE — 90686 IIV4 VACC NO PRSV 0.5 ML IM: CPT | Performed by: FAMILY MEDICINE

## 2022-11-02 NOTE — PROGRESS NOTES
"Chief Complaint  Hypertension    Subjective        Noemi Guy presents to [facility]  History of Present Illness     Hypertension follow-up.  She continues on lisinopril 20 mg a day and hydrochlorothiazide 12.5 mg daily.  No dry cough.  No angioedema.  Recent creatinine electrolytes normal.  Blood pressures running normal at home.  Normal today.  No cardiovascular symptoms.    Impaired fasting glucose.  The glucose is slightly elevated compared to previously.  She states she ate out to the night before.  Her however her A1c is improved to 5.9%.    Objective   Vital Signs:  /87   Pulse 86   Temp 96.9 °F (36.1 °C) (Temporal)   Ht 165.1 cm (65\")   Wt 86.1 kg (189 lb 12.8 oz)   SpO2 99%   BMI 31.58 kg/m²   Estimated body mass index is 31.58 kg/m² as calculated from the following:    Height as of this encounter: 165.1 cm (65\").    Weight as of this encounter: 86.1 kg (189 lb 12.8 oz).          Physical Exam  Vitals and nursing note reviewed.   Constitutional:       General: She is not in acute distress.     Appearance: She is well-developed.   Cardiovascular:      Rate and Rhythm: Normal rate and regular rhythm.      Heart sounds: Normal heart sounds.   Pulmonary:      Effort: Pulmonary effort is normal.      Breath sounds: Normal breath sounds.   Musculoskeletal:      Cervical back: Normal range of motion.   Skin:     General: Skin is warm and dry.   Psychiatric:         Mood and Affect: Mood normal.        Result Review :  The following data was reviewed by: Sher Babb MD on 11/02/2022:  Common labs    Common Labs 4/19/22 4/19/22 4/19/22 10/26/22 10/26/22    1022 1022 1022 0925 0925   Glucose  69   111 (A)   BUN  10   9   Creatinine  0.91   0.79   Sodium  140   141   Potassium  4.0   4.7   Chloride  103   103   Calcium  9.8   10.2   Total Protein  7.4   7.6   Albumin  4.7   4.60   Total Bilirubin  0.3   0.5   Alkaline Phosphatase  52   50   AST (SGOT)  14   17 "   ALT (SGPT)  13   17   Total Cholesterol   161     Triglycerides   105     HDL Cholesterol   71     LDL Cholesterol    71     Hemoglobin A1C 6.0 (A)   5.90 (A)    (A) Abnormal value       Comments are available for some flowsheets but are not being displayed.                     Assessment and Plan   Diagnoses and all orders for this visit:    1. Essential hypertension (Primary)  -     CBC & Differential; Future  -     Comprehensive Metabolic Panel; Future  -     Lipid Panel; Future  -     Hemoglobin A1c; Future  -     Urinalysis With Microscopic If Indicated (No Culture) - Urine, Clean Catch; Future  -     TSH Rfx On Abnormal To Free T4; Future  -     Vitamin D,25-Hydroxy; Future    2. Impaired fasting glucose  -     CBC & Differential; Future  -     Comprehensive Metabolic Panel; Future  -     Lipid Panel; Future  -     Hemoglobin A1c; Future  -     Urinalysis With Microscopic If Indicated (No Culture) - Urine, Clean Catch; Future  -     TSH Rfx On Abnormal To Free T4; Future  -     Vitamin D,25-Hydroxy; Future    3. Health care maintenance  -     CBC & Differential; Future  -     Comprehensive Metabolic Panel; Future  -     Lipid Panel; Future  -     Hemoglobin A1c; Future  -     Urinalysis With Microscopic If Indicated (No Culture) - Urine, Clean Catch; Future  -     TSH Rfx On Abnormal To Free T4; Future  -     Vitamin D,25-Hydroxy; Future        Follow-up hypertension.  Doing well.  No change in medication.  Her borderline impaired fasting glucose is overall stable with an improved A1c.  We will continue to monitor both.  I will see her back in 6 months for complete physical examination with lab work prior.         Follow Up   No follow-ups on file.  Patient was given instructions and counseling regarding her condition or for health maintenance advice. Please see specific information pulled into the AVS if appropriate.        Regular diet, thin liquids

## 2022-12-23 ENCOUNTER — TELEPHONE (OUTPATIENT)
Dept: GASTROENTEROLOGY | Facility: CLINIC | Age: 47
End: 2022-12-23

## 2022-12-23 NOTE — TELEPHONE ENCOUNTER
PA for Trulance 3MG tablets submitted through [facility] and pending.   Key: [id number]

## 2022-12-28 NOTE — TELEPHONE ENCOUNTER
Per M:  PA Case: [id number], Status: Approved, Coverage Starts on: 12/23/2022 12:00:00 AM, Coverage Ends on: 12/31/2023 12:00:00 AM.

## 2023-01-03 RX ORDER — LISINOPRIL 20 MG/1
20 TABLET ORAL DAILY
Qty: 90 TABLET | Refills: 1 | Status: SHIPPED | OUTPATIENT
Start: 2023-01-03

## 2023-01-03 RX ORDER — HYDROCHLOROTHIAZIDE 12.5 MG/1
12.5 TABLET ORAL DAILY
Qty: 90 TABLET | Refills: 1 | Status: SHIPPED | OUTPATIENT
Start: 2023-01-03

## 2023-01-03 NOTE — TELEPHONE ENCOUNTER
Rx Refill Note  Requested Prescriptions     Pending Prescriptions Disp Refills   • lisinopril (PRINIVIL,ZESTRIL) 20 MG tablet 90 tablet 1     Sig: Take 1 tablet by mouth Daily.   • hydroCHLOROthiazide (HYDRODIURIL) 12.5 MG tablet 90 tablet 1     Sig: Take 1 tablet by mouth Daily.      Last office visit with prescribing clinician: 11/2/2022   Last telemedicine visit with prescribing clinician: 4/26/2023   Next office visit with prescribing clinician: 5/2/2023                         Would you like a call back once the refill request has been completed: [] Yes [] No    If the office needs to give you a call back, can they leave a voicemail: [] Yes [] No    Felipe Rivera  01/03/23, 13:34 EST

## 2025-01-08 NOTE — PROGRESS NOTES
Continue Flomax   Subjective   Noemi Holder is a 44 y.o. female.     Chief Complaint   Patient presents with   • Hypertension     follow up        Hypertension  This is a recurrent problem. The current episode started more than 1 year ago. The problem has been gradually improving since onset. The problem is controlled. Pertinent negatives include no anxiety, blurred vision, chest pain, headaches, malaise/fatigue, neck pain, orthopnea, palpitations, peripheral edema, PND, shortness of breath or sweats. Agents associated with hypertension include NSAIDs. Risk factors for coronary artery disease include no known risk factors and obesity. Compliance problems include diet.      Hypertension.  Not controlled.  She continues on losartan 50 mg a day.  She previously was taking hydrochlorothiazide but it was causing some increased urination.  It was not a problem but it was somewhat of a nuisance.  She states her mother takes lisinopril without problem.  Also hydrochlorothiazide without problem.  Patient is interested in switching to lisinopril.  She states she understands the increased risk potentially of angioedema, although still rare.  Her kidney function in the past has been fine.  Blood pressure at home running about 130 over high 80s low 90s fairly consistently.    Impaired fasting glucose.  We have not checked an A1c in some time.  Previous maximum A1c 6% 2018.  She is on Metformin to help decrease progression potentially to diabetes.      The following portions of the patient's history were reviewed and updated as appropriate: allergies, current medications, past family history, past medical history, past social history, past surgical history and problem list.          Review of Systems   Constitutional: Negative.  Negative for malaise/fatigue.   Eyes: Negative for blurred vision.   Respiratory: Negative for shortness of breath.    Cardiovascular: Negative for chest pain, palpitations, orthopnea and PND.   Musculoskeletal:  "Negative for neck pain.   Neurological: Negative for headaches.       Objective   Blood pressure 134/90, pulse 87, temperature 97.7 °F (36.5 °C), temperature source Temporal, height 165.1 cm (65\"), weight 88.6 kg (195 lb 6.4 oz), last menstrual period 03/03/2016, SpO2 99 %.  Physical Exam  Vitals signs and nursing note reviewed.   Constitutional:       General: She is not in acute distress.     Appearance: She is well-developed.   Neck:      Thyroid: No thyromegaly.   Cardiovascular:      Rate and Rhythm: Normal rate and regular rhythm.      Heart sounds: Normal heart sounds.   Pulmonary:      Effort: Pulmonary effort is normal.      Breath sounds: Normal breath sounds.   Skin:     General: Skin is warm and dry.   Psychiatric:         Behavior: Behavior normal.         Thought Content: Thought content normal.         Judgment: Judgment normal.         Assessment/Plan   Diagnoses and all orders for this visit:    1. Essential hypertension (Primary)  -     Comprehensive Metabolic Panel  -     Lipid Panel    2. Need for vaccination  -     FluLaval Quad >6 Months (4940-2109)    3. Impaired fasting glucose  -     Hemoglobin A1c    Other orders  -     lisinopril-hydrochlorothiazide (PRINZIDE,ZESTORETIC) 20-12.5 MG per tablet; Take 1 tablet by mouth Daily.  Dispense: 90 tablet; Refill: 1        Hypertension.  Not controlled.  Primarily central diastolic hypertension.  Discontinue losartan.  Switching to lisinopril hydrochlorothiazide 20-12 0.5.  1 a day.  Benefits and risk discussed.  Checking lab work today.  I will see her back in 6 months for complete physical examination with lab work prior.  Also checking lab work today.  She will call with questions.  She also will send blood pressure readings within 6 weeks.  We may have to further adjust by increasing hydrochlorothiazide.    Impaired fasting glucose.  At risk for diabetes.  Continue Metformin extended release 500 mg daily.  Checking A1c today.       Answers for " HPI/ROS submitted by the patient on 10/26/2020   Hypertension  What is the primary reason for your visit?: High Blood Pressure